# Patient Record
Sex: MALE | Race: WHITE | NOT HISPANIC OR LATINO | Employment: OTHER | URBAN - METROPOLITAN AREA
[De-identification: names, ages, dates, MRNs, and addresses within clinical notes are randomized per-mention and may not be internally consistent; named-entity substitution may affect disease eponyms.]

---

## 2017-03-17 ENCOUNTER — OFFICE VISIT (OUTPATIENT)
Dept: CARDIOLOGY | Facility: CLINIC | Age: 62
End: 2017-03-17

## 2017-03-17 VITALS
WEIGHT: 187 LBS | HEART RATE: 68 BPM | SYSTOLIC BLOOD PRESSURE: 173 MMHG | BODY MASS INDEX: 27.7 KG/M2 | DIASTOLIC BLOOD PRESSURE: 94 MMHG | HEIGHT: 69 IN

## 2017-03-17 DIAGNOSIS — E78.2 MIXED HYPERLIPIDEMIA: ICD-10-CM

## 2017-03-17 DIAGNOSIS — I50.22 CHRONIC SYSTOLIC CONGESTIVE HEART FAILURE (HCC): Primary | ICD-10-CM

## 2017-03-17 DIAGNOSIS — I10 ESSENTIAL HYPERTENSION: ICD-10-CM

## 2017-03-17 PROCEDURE — 99213 OFFICE O/P EST LOW 20 MIN: CPT | Performed by: INTERNAL MEDICINE

## 2017-03-17 RX ORDER — CARVEDILOL 12.5 MG/1
12.5 TABLET ORAL 2 TIMES DAILY WITH MEALS
COMMUNITY
Start: 2017-03-13 | End: 2017-06-14 | Stop reason: SDUPTHER

## 2017-03-17 RX ORDER — AMLODIPINE BESYLATE 10 MG/1
TABLET ORAL
COMMUNITY
Start: 2017-03-13 | End: 2017-06-14 | Stop reason: SDUPTHER

## 2017-03-17 RX ORDER — CHLORTHALIDONE 25 MG/1
25 TABLET ORAL DAILY
Qty: 90 TABLET | Refills: 3 | Status: SHIPPED | OUTPATIENT
Start: 2017-03-17 | End: 2017-10-19

## 2017-03-17 NOTE — PROGRESS NOTES
[facility] at [facility]  Office Progress Note  Rony He  1955  [phone]      Visit Date: 03/17/2017    PCP: Arely Vidal MD  [address] [facility] [address]    IDENTIFICATION: A 61 y.o. male from [place], [place].    Chief Complaint   Patient presents with   • Follow-up     HTN/CAD       PROBLEM LIST:   1.  coronary artery disease:  a. In 2007, EKG stress: 13 METs with no arrhythmias.  b. In 2004, Cardiolite stress with inferior scar, EF 53%.  c. 2002 C [place] reportedly w anterior MI - data def  2. Dyslipidemia.  a. April 2015: Total cholesterol 2003, triglycerides 190, HDL 31, and .   3. Hypertension -accelerated  4. CHF       2/17 Echo sev LVH EF 45%- [facility]       2/17 flash pulm edema [facility] SBP reportedly 300  5. Remote appendectomy and inguinal herniorrhaphy x2.  6. Nicotine add- reformed    Allergies  Allergies   Allergen Reactions   • Penicillins        Current Medications    Current Outpatient Prescriptions:   •  aspirin 325 MG tablet, Take 1 tablet by mouth Daily., Disp: 90 tablet, Rfl: 3  •  atorvastatin (LIPITOR) 80 MG tablet, Take 1 tablet by mouth Daily., Disp: 90 tablet, Rfl: 3  •  carvedilol (COREG) 12.5 MG tablet, 12.5 mg 2 (Two) Times a Day With Meals., Disp: , Rfl:   •  isosorbide mononitrate (IMDUR) 60 MG 24 hr tablet, Take 1 tablet by mouth Daily., Disp: 90 tablet, Rfl: 3  •  losartan (COZAAR) 100 MG tablet, Take 1 tablet by mouth Daily., Disp: 90 tablet, Rfl: 3  •  nitroglycerin (NITROSTAT) 0.4 MG SL tablet, Place 1 tablet under the tongue Every 5 (Five) Minutes As Needed for chest pain. Take no more than 3 doses in 15 minutes., Disp: 30 tablet, Rfl: 3  •  amLODIPine (NORVASC) 10 MG tablet, , Disp: , Rfl:       History of Present Illness     Pt presents in follow-up he was taken U [facility] 2 of 17 with flash pulmonary edema  Hypertension with systolic of 300.  He underwent a secondary evaluation for hypertension that was benign was diuresed with  "adjustment of blood pressure medications and states he has improved.  He is still unclear as to the exact culprit of his demise.  He states he had a flulike illness week before and was febrile and was taking over-the-counter preparations.  He states at home his blood pressure typically with systolics of 150s since discharge from hospital.    ROS:  All systems have been reviewed and are negative with the exception of those mentioned in the HPI.    OBJECTIVE:  Vitals:    03/17/17 0955 03/17/17 1000   BP: (!) 191/96 173/94   BP Location: Left arm Left arm   Patient Position: Sitting Standing   Pulse: 67 68   Weight: 187 lb (84.8 kg)    Height: 69\" (175.3 cm)      Physical Exam   Constitutional: He appears well-developed and well-nourished.   Neck: Normal range of motion. Neck supple. No hepatojugular reflux and no JVD present. Carotid bruit is not present. No tracheal deviation present. No thyromegaly present.   Cardiovascular: Normal rate, regular rhythm, S1 normal, S2 normal, intact distal pulses and normal pulses.  PMI is not displaced.  Exam reveals gallop and S4. Exam reveals no distant heart sounds, no friction rub, no midsystolic click and no opening snap.    No murmur heard.  Pulses:       Radial pulses are 2+ on the right side, and 2+ on the left side.        Dorsalis pedis pulses are 2+ on the right side, and 2+ on the left side.        Posterior tibial pulses are 2+ on the right side, and 2+ on the left side.   Pulmonary/Chest: Effort normal and breath sounds normal. He has no wheezes. He has no rales.   Abdominal: Soft. Bowel sounds are normal. He exhibits no mass. There is no tenderness. There is no guarding.       Diagnostic Data:  Procedures      ASSESSMENT:   Diagnosis Plan   1. Chronic systolic congestive heart failure     2. Essential hypertension     3. Mixed hyperlipidemia         PLAN:  Hypertensive cardiomyopathy with uncontrolled blood pressure we will follow-up his blood pressure medications at " his local pharmacy.  I would add chlorthalidone to his current with a low threshold for clonidine if uncontrolled with addition of after mentioned.    Remote coronary disease no anginal equivalent with no enzyme bump despite blood pressure of 300 will continue observation    Dyslipidemia on statin therapy    Arely Vidal MD, thank you for referring Mr. He for evaluation.  I have forwarded my electronically generated recommendations to you for review.  Please do not hesitate to call with any questions.      Job Wood MD, FACC

## 2017-03-23 ENCOUNTER — TELEPHONE (OUTPATIENT)
Dept: CARDIOLOGY | Facility: CLINIC | Age: 62
End: 2017-03-23

## 2017-06-14 RX ORDER — AMLODIPINE BESYLATE 10 MG/1
10 TABLET ORAL DAILY
Qty: 90 TABLET | Refills: 1 | Status: SHIPPED | OUTPATIENT
Start: 2017-06-14 | End: 2017-10-19 | Stop reason: SDUPTHER

## 2017-06-14 RX ORDER — CARVEDILOL 12.5 MG/1
12.5 TABLET ORAL 2 TIMES DAILY WITH MEALS
Qty: 180 TABLET | Refills: 1 | Status: SHIPPED | OUTPATIENT
Start: 2017-06-14 | End: 2017-10-19 | Stop reason: SDUPTHER

## 2017-10-19 ENCOUNTER — OFFICE VISIT (OUTPATIENT)
Dept: CARDIOLOGY | Facility: CLINIC | Age: 62
End: 2017-10-19

## 2017-10-19 VITALS
BODY MASS INDEX: 29.78 KG/M2 | HEART RATE: 74 BPM | DIASTOLIC BLOOD PRESSURE: 103 MMHG | SYSTOLIC BLOOD PRESSURE: 184 MMHG | WEIGHT: 208 LBS | HEIGHT: 70 IN

## 2017-10-19 DIAGNOSIS — I10 ESSENTIAL HYPERTENSION: Primary | ICD-10-CM

## 2017-10-19 DIAGNOSIS — E78.5 DYSLIPIDEMIA: ICD-10-CM

## 2017-10-19 DIAGNOSIS — I50.22 CHRONIC SYSTOLIC CONGESTIVE HEART FAILURE (HCC): ICD-10-CM

## 2017-10-19 PROCEDURE — 99214 OFFICE O/P EST MOD 30 MIN: CPT | Performed by: INTERNAL MEDICINE

## 2017-10-19 RX ORDER — CARVEDILOL 12.5 MG/1
12.5 TABLET ORAL 2 TIMES DAILY WITH MEALS
Qty: 60 TABLET | Refills: 11 | Status: SHIPPED | OUTPATIENT
Start: 2017-10-19 | End: 2018-09-24 | Stop reason: SDUPTHER

## 2017-10-19 RX ORDER — CLONIDINE HYDROCHLORIDE 0.1 MG/1
0.1 TABLET ORAL 3 TIMES DAILY
Qty: 270 TABLET | Refills: 3 | Status: SHIPPED | OUTPATIENT
Start: 2017-10-19 | End: 2018-09-24 | Stop reason: SDUPTHER

## 2017-10-19 RX ORDER — ATORVASTATIN CALCIUM 20 MG/1
20 TABLET, FILM COATED ORAL DAILY
Qty: 30 TABLET | Refills: 11 | Status: SHIPPED | OUTPATIENT
Start: 2017-10-19 | End: 2018-09-24 | Stop reason: SDUPTHER

## 2017-10-19 RX ORDER — AMLODIPINE BESYLATE 10 MG/1
10 TABLET ORAL DAILY
Qty: 30 TABLET | Refills: 11 | Status: SHIPPED | OUTPATIENT
Start: 2017-10-19 | End: 2018-09-24 | Stop reason: SDUPTHER

## 2017-10-19 RX ORDER — ISOSORBIDE MONONITRATE 60 MG/1
60 TABLET, EXTENDED RELEASE ORAL DAILY
Qty: 30 TABLET | Refills: 11 | Status: SHIPPED | OUTPATIENT
Start: 2017-10-19 | End: 2018-09-24 | Stop reason: SDUPTHER

## 2017-10-19 NOTE — PROGRESS NOTES
[facility] at [facility]  Office Progress Note  Rony He  1955  [phone]      Visit Date: 10/19/2017    PCP: BRET GUTIERREZ  [address]  [facility] [address]    IDENTIFICATION: A 62 y.o. male from [place], [place]    Chief Complaint   Patient presents with   • Follow-up   • Coronary Artery Disease   • Hypertension       PROBLEM LIST:   1. Coronary artery disease:  a. In 2007, EKG stress: 13 METs with no arrhythmias.  b. In 2004, Cardiolite stress with inferior scar, EF 53%.  c. 2002 C [place] reportedly w anterior MI - data def  d. MPS 9/2017: Inferior scar and no evidence for ischemia, LVEF 35%  2. Dyslipidemia.  a. April 2015: Total cholesterol 2003, triglycerides 190, HDL 31, and .   3. Hypertension -accelerated  4. CHF  a. 2/17 Echo sev LVH EF 45%- [facility]  b. 2/17 flash pulm edema [facility] SBP reportedly 300  c. 9/17 echo [place] LVEF 45%  5. Remote appendectomy and inguinal herniorrhaphy x2.  6. Nicotine add- reformed    7. CKD 3- creat 1.9 2017.  Had dc of acei/diuretic then.  Allergies  Allergies   Allergen Reactions   • Penicillins        Current Medications    Current Outpatient Prescriptions:   •  amLODIPine (NORVASC) 10 MG tablet, Take 1 tablet by mouth Daily., Disp: 30 tablet, Rfl: 11  •  aspirin 325 MG tablet, Take 1 tablet by mouth Daily., Disp: 90 tablet, Rfl: 3  •  atorvastatin (LIPITOR) 20 MG tablet, Take 1 tablet by mouth Daily., Disp: 30 tablet, Rfl: 11  •  carvedilol (COREG) 12.5 MG tablet, Take 1 tablet by mouth 2 (Two) Times a Day With Meals., Disp: 60 tablet, Rfl: 11  •  isosorbide mononitrate (IMDUR) 60 MG 24 hr tablet, Take 1 tablet by mouth Daily., Disp: 30 tablet, Rfl: 11  •  nitroglycerin (NITROSTAT) 0.4 MG SL tablet, Place 1 tablet under the tongue Every 5 (Five) Minutes As Needed for chest pain. Take no more than 3 doses in 15 minutes., Disp: 30 tablet, Rfl: 3      History of Present Illness   Patient here for follow-up.  He was recently seen at  "[facility] on 9/24/17 for chest pain and at that time he had 3 negative troponins and no acute EKG changes.  He underwent a nuclear stress test that showed prior inferior infarct with no evidence of myocardial ischemia acutely, and a LVEF of 35%.  An echocardiogram then showed LVEF 40-45%, mildly dilated LV, akinesis of the inferior wall and lateral wall.  Stage I diastolic dysfunction.  Elevated LV filling pressures, and moderate eccentric LVH, moderately dilated LA.  His initial EKG did show prolonged QTC, however this decreased the next day.  Additionally it was noted on telemetry that he had a 12 beat run of nonsustained ventricular tachycardia at which time he was asymptomatic. The cardiologist in [place] recommended he follow up with Dr. Wood sooner than scheduled. Since then, he denies any chest pain, dyspnea, dyspnea on exertion, orthopnea, PND, palpitations, lower extremity edema, or claudication.  His BP is up today, and his chlorthalidone and losartan were d/c'd at the [facility] due to a serum Cr of 1.9. Pt reports that his BP has been high at home as well. He has taken 50mg of his wife's hydralazine and it helped.     ROS:  All systems have been reviewed and are negative with the exception of those mentioned in the HPI.    OBJECTIVE:  Vitals:    10/19/17 0907   BP: (!) 184/103   BP Location: Left arm   Patient Position: Sitting   Pulse: 74   Weight: 208 lb (94.3 kg)   Height: 70\" (177.8 cm)     Physical Exam   Constitutional: He is oriented to person, place, and time. He appears well-developed and well-nourished. No distress.   obese   Neck: Normal range of motion. Neck supple. No hepatojugular reflux and no JVD present. Carotid bruit is not present. No tracheal deviation present. No thyromegaly present.   Cardiovascular: Normal rate, regular rhythm, S1 normal, S2 normal, intact distal pulses and normal pulses.  PMI is not displaced.  Exam reveals no distant heart " sounds, no friction rub, no midsystolic click and no opening snap.    No murmur heard.  Pulses:       Radial pulses are 2+ on the right side, and 2+ on the left side.        Dorsalis pedis pulses are 2+ on the right side, and 2+ on the left side.        Posterior tibial pulses are 2+ on the right side, and 2+ on the left side.   Pulmonary/Chest: Effort normal and breath sounds normal. He has no wheezes. He has no rales.   Abdominal: Soft. Bowel sounds are normal. He exhibits no mass. There is no tenderness. There is no guarding.   Musculoskeletal: He exhibits no edema or tenderness.   Neurological: He is alert and oriented to person, place, and time.   Skin: Skin is warm and dry. No rash noted.   Psychiatric: He has a normal mood and affect.   Nursing note and vitals reviewed.      Diagnostic Data:  Procedures      ASSESSMENT:   Diagnosis Plan   1. Essential hypertension     2. Dyslipidemia     3. Chronic systolic congestive heart failure         PLAN:  1. Poor control of hypertension with cessation of arband thiazide.  We will add clonidine 0.1 3 times daily.  Patient is to call our office in 2 weeks with his BP readings we will titrate as needed.  2. Continue statin therapy  3. We will follow-up with echocardiograms    BRET GUTIERREZ, thank you for referring Mr. He for evaluation.  I have forwarded my electronically generated recommendations to you for review.  Please do not hesitate to call with any questions.    Scribed for Job Wood MD by Shirlene Christian PA-C. 10/19/2017  10:02 AM  I, Job Wood MD, personally performed the services described in this documentation as scribed by the above named individual in my presence, and it is both accurate and complete.  10/19/2017  11:26 AM    Job Wood MD, [facility]

## 2017-11-03 ENCOUNTER — TELEPHONE (OUTPATIENT)
Dept: CARDIOLOGY | Facility: CLINIC | Age: 62
End: 2017-11-03

## 2017-11-03 NOTE — TELEPHONE ENCOUNTER
Patient calls to report BP readings for the last couple weeks as Dr. Wood instructed of him.     10/20: 149/77  10/21: 137/76  10/22: 138/64  10/23: 137/66  10/24: 136/64  10/25: 144/72  10/26: 140/75  10/27: 141/70  10/28: 142/69  10/29: 139/68   10/30: 139/70  10/31: 141/72  11/1: 143/71  11/2: 141/72    Patient has been taking his newly prescribed clonidine 0.1 mg TID as instructed by Dr. Wood. Informed him these were much improved BP from the readings in our office. Encouraged him to continue his current medical therapy and to call back with any further issues. He verbalized understanding.

## 2018-09-24 DIAGNOSIS — I10 ESSENTIAL HYPERTENSION: ICD-10-CM

## 2018-09-24 RX ORDER — ISOSORBIDE MONONITRATE 60 MG/1
60 TABLET, EXTENDED RELEASE ORAL DAILY
Qty: 30 TABLET | Refills: 2 | Status: SHIPPED | OUTPATIENT
Start: 2018-09-24 | End: 2019-01-05 | Stop reason: SDUPTHER

## 2018-09-24 RX ORDER — CLONIDINE HYDROCHLORIDE 0.1 MG/1
0.1 TABLET ORAL 3 TIMES DAILY
Qty: 270 TABLET | Refills: 3 | Status: SHIPPED | OUTPATIENT
Start: 2018-09-24 | End: 2019-01-07 | Stop reason: SDUPTHER

## 2018-09-24 RX ORDER — CARVEDILOL 12.5 MG/1
12.5 TABLET ORAL 2 TIMES DAILY WITH MEALS
Qty: 60 TABLET | Refills: 2 | Status: SHIPPED | OUTPATIENT
Start: 2018-09-24 | End: 2019-01-07 | Stop reason: SDUPTHER

## 2018-09-24 RX ORDER — AMLODIPINE BESYLATE 10 MG/1
10 TABLET ORAL DAILY
Qty: 30 TABLET | Refills: 2 | Status: SHIPPED | OUTPATIENT
Start: 2018-09-24 | End: 2018-12-18 | Stop reason: SINTOL

## 2018-09-24 RX ORDER — ATORVASTATIN CALCIUM 20 MG/1
20 TABLET, FILM COATED ORAL DAILY
Qty: 30 TABLET | Refills: 2 | Status: SHIPPED | OUTPATIENT
Start: 2018-09-24 | End: 2019-01-05 | Stop reason: SDUPTHER

## 2018-12-13 ENCOUNTER — LAB (OUTPATIENT)
Dept: LAB | Facility: HOSPITAL | Age: 63
End: 2018-12-13

## 2018-12-13 ENCOUNTER — OFFICE VISIT (OUTPATIENT)
Dept: CARDIOLOGY | Facility: CLINIC | Age: 63
End: 2018-12-13

## 2018-12-13 ENCOUNTER — TRANSCRIBE ORDERS (OUTPATIENT)
Dept: LAB | Facility: HOSPITAL | Age: 63
End: 2018-12-13

## 2018-12-13 VITALS
BODY MASS INDEX: 28.45 KG/M2 | DIASTOLIC BLOOD PRESSURE: 90 MMHG | WEIGHT: 198.7 LBS | OXYGEN SATURATION: 96 % | HEIGHT: 70 IN | SYSTOLIC BLOOD PRESSURE: 152 MMHG | HEART RATE: 71 BPM

## 2018-12-13 DIAGNOSIS — Q75.2 HYPERTELORISM: Primary | ICD-10-CM

## 2018-12-13 DIAGNOSIS — N18.30 STAGE 3 CHRONIC KIDNEY DISEASE (HCC): ICD-10-CM

## 2018-12-13 DIAGNOSIS — I51.7 LVH (LEFT VENTRICULAR HYPERTROPHY): ICD-10-CM

## 2018-12-13 DIAGNOSIS — E78.2 MIXED HYPERLIPIDEMIA: ICD-10-CM

## 2018-12-13 DIAGNOSIS — I10 ESSENTIAL HYPERTENSION: Primary | ICD-10-CM

## 2018-12-13 LAB
ANION GAP SERPL CALCULATED.3IONS-SCNC: 8 MMOL/L (ref 3–11)
BNP SERPL-MCNC: 347 PG/ML (ref 0–100)
BUN BLD-MCNC: 38 MG/DL (ref 9–23)
BUN/CREAT SERPL: 23.5 (ref 7–25)
CALCIUM SPEC-SCNC: 8.6 MG/DL (ref 8.7–10.4)
CHLORIDE SERPL-SCNC: 104 MMOL/L (ref 99–109)
CO2 SERPL-SCNC: 27 MMOL/L (ref 20–31)
CREAT BLD-MCNC: 1.62 MG/DL (ref 0.6–1.3)
DEPRECATED RDW RBC AUTO: 43.8 FL (ref 37–54)
ERYTHROCYTE [DISTWIDTH] IN BLOOD BY AUTOMATED COUNT: 14 % (ref 11.3–14.5)
GFR SERPL CREATININE-BSD FRML MDRD: 43 ML/MIN/1.73
GLUCOSE BLD-MCNC: 83 MG/DL (ref 70–100)
HCT VFR BLD AUTO: 42.6 % (ref 38.9–50.9)
HGB BLD-MCNC: 13.9 G/DL (ref 13.1–17.5)
MCH RBC QN AUTO: 27.9 PG (ref 27–31)
MCHC RBC AUTO-ENTMCNC: 32.6 G/DL (ref 32–36)
MCV RBC AUTO: 85.5 FL (ref 80–99)
PLATELET # BLD AUTO: 269 10*3/MM3 (ref 150–450)
PMV BLD AUTO: 12.7 FL (ref 6–12)
POTASSIUM BLD-SCNC: 4.1 MMOL/L (ref 3.5–5.5)
RBC # BLD AUTO: 4.98 10*6/MM3 (ref 4.2–5.76)
SODIUM BLD-SCNC: 139 MMOL/L (ref 132–146)
WBC NRBC COR # BLD: 10.17 10*3/MM3 (ref 3.5–10.8)

## 2018-12-13 PROCEDURE — 85027 COMPLETE CBC AUTOMATED: CPT | Performed by: INTERNAL MEDICINE

## 2018-12-13 PROCEDURE — 83880 ASSAY OF NATRIURETIC PEPTIDE: CPT

## 2018-12-13 PROCEDURE — 36415 COLL VENOUS BLD VENIPUNCTURE: CPT | Performed by: INTERNAL MEDICINE

## 2018-12-13 PROCEDURE — 80048 BASIC METABOLIC PNL TOTAL CA: CPT | Performed by: INTERNAL MEDICINE

## 2018-12-13 PROCEDURE — 99213 OFFICE O/P EST LOW 20 MIN: CPT | Performed by: INTERNAL MEDICINE

## 2018-12-13 NOTE — PROGRESS NOTES
[facility] at [facility]  Office Progress Note  Rony He  1955  [phone]      Visit Date: 12/13/2018     PCP: Provider, No Known  [facility]  [facility] [address]    IDENTIFICATION: A 63 y.o. male  2019,  from [place], [place]    Chief Complaint   Patient presents with   • Hypertension   • Coronary Artery Disease       PROBLEM LIST:   1. Coronary artery disease:  a. In 2007, EKG stress: 13 METs with no arrhythmias.  b. In 2004, Cardiolite stress with inferior scar, EF 53%.  c. 2002 C [place] reportedly w anterior MI - data def  d. MPS 9/2017: Inferior scar and no evidence for ischemia, LVEF 35%  2. Dyslipidemia.  a. April 2015: Total cholesterol 2003, triglycerides 190, HDL 31, and .   3. Hypertension -accelerated  4. CHF  a. 2/17 Echo sev LVH EF 45%- [facility]  b. 2/17 flash pulm edema [facility] SBP reportedly 300  c. 9/17 echo [place] LVEF 45%  5. Remote appendectomy and inguinal herniorrhaphy x2.  6. Nicotine add- reformed  7. CKD 3- creat 1.9 2017.  Had dc of acei/diuretic then.    Allergies  Allergies   Allergen Reactions   • Penicillins        Current Medications    Current Outpatient Medications:   •  amLODIPine (NORVASC) 10 MG tablet, Take 1 tablet by mouth Daily., Disp: 30 tablet, Rfl: 2  •  aspirin 325 MG tablet, Take 1 tablet by mouth Daily., Disp: 90 tablet, Rfl: 3  •  atorvastatin (LIPITOR) 20 MG tablet, Take 1 tablet by mouth Daily., Disp: 30 tablet, Rfl: 2  •  carvedilol (COREG) 12.5 MG tablet, Take 1 tablet by mouth 2 (Two) Times a Day With Meals., Disp: 60 tablet, Rfl: 2  •  CloNIDine (CATAPRES) 0.1 MG tablet, Take 1 tablet by mouth 3 (Three) Times a Day., Disp: 270 tablet, Rfl: 3  •  isosorbide mononitrate (IMDUR) 60 MG 24 hr tablet, Take 1 tablet by mouth Daily., Disp: 30 tablet, Rfl: 2  •  nitroglycerin (NITROSTAT) 0.4 MG SL tablet, Place 1 tablet under the tongue Every 5 (Five) Minutes As Needed for chest pain. Take no more than 3 doses in 15  "minutes., Disp: 30 tablet, Rfl: 3      History of Present Illness   Patient here for follow-up. His wife passed away earlier this year and he has been lost to fu.  He feels that transition of his bp meds 2 years ago by inpt mds at [place] have resulted in significant lower extremity swelling and nocturia.  He states he has chronic fatigue.  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  He notes no overt chest discomfort his baseline shortness of breath is not significant change in his had no lab work this year  ROS:  All systems have been reviewed and are negative with the exception of those mentioned in the HPI.    OBJECTIVE:  Vitals:    12/13/18 1050   BP: 152/90   BP Location: Left arm   Patient Position: Sitting   Pulse: 71   SpO2: 96%   Weight: 90.1 kg (198 lb 11.2 oz)   Height: 177.8 cm (70\")     Physical Exam   Constitutional: He is oriented to person, place, and time. He appears well-developed and well-nourished. No distress.   obese   Neck: Normal range of motion. Neck supple. No hepatojugular reflux and no JVD present. Carotid bruit is not present. No tracheal deviation present. No thyromegaly present.   Cardiovascular: Normal rate, regular rhythm, S1 normal, S2 normal, intact distal pulses and normal pulses. PMI is not displaced. Exam reveals no distant heart sounds, no friction rub, no midsystolic click and no opening snap.   No murmur heard.  Pulses:       Radial pulses are 2+ on the right side, and 2+ on the left side.        Dorsalis pedis pulses are 2+ on the right side, and 2+ on the left side.        Posterior tibial pulses are 2+ on the right side, and 2+ on the left side.   Pulmonary/Chest: Effort normal and breath sounds normal. He has no wheezes. He has no rales.   Abdominal: Soft. Bowel sounds are normal. He exhibits no mass. There is no tenderness. There is no guarding.   Musculoskeletal: He exhibits no edema or tenderness.   Neurological: He is " alert and oriented to person, place, and time.   Skin: Skin is warm and dry. No rash noted.   Psychiatric: He has a normal mood and affect.   Nursing note and vitals reviewed.      Diagnostic Data:  Procedures      ASSESSMENT:   Diagnosis Plan   1. Essential hypertension     2. Mixed hyperlipidemia     3. LVH (left ventricular hypertrophy)         PLAN:  1. Poor control of hypertension .  He will continue to utilize current medication until lab work obtained today can be   2. Continue statin therapy  3. We will follow-up with echocardiograms    Provider, No Known, thank you for referring Mr. He for evaluation.  I have forwarded my electronically generated recommendations to you for review.  Please do not hesitate to call with any questions.    Scribed for Job Wood MD by Shirlene Christian PA-C. 12/13/2018  12:57 PM   I, Job Wood MD, personally performed the services described in this documentation as scribed by the above named individual in my presence, and it is both accurate and complete.  12/13/2018  12:58 PM    Job Wood MD, [facility]

## 2018-12-18 ENCOUNTER — TELEPHONE (OUTPATIENT)
Dept: CARDIOLOGY | Facility: CLINIC | Age: 63
End: 2018-12-18

## 2018-12-18 RX ORDER — NIFEDIPINE 60 MG/1
60 TABLET, EXTENDED RELEASE ORAL DAILY
Qty: 90 TABLET | Refills: 3 | Status: SHIPPED | OUTPATIENT
Start: 2018-12-18 | End: 2019-02-11

## 2018-12-18 NOTE — TELEPHONE ENCOUNTER
Called patient and let him know that Dr Wood reviewed his labs and wanted him to know that his kidney function mildly worsened ,Follow bps , Continue current Rx.     Patient state dhe does not want to take amlodipine as it dehydrates him and he has been taking HCTZ. Informed him we do not have HCTZ listed but with his worsened kidney function would not recommend he take without MD approval. Patient verbalized understanding but said he will not take amlodipine and wants a alternative medication.

## 2018-12-18 NOTE — TELEPHONE ENCOUNTER
Called patient and let him know to stop the amlodipine and Dr Wood sent in procardia xl 60mg daily. Patient verbalized understanding.

## 2019-01-05 DIAGNOSIS — I10 ESSENTIAL HYPERTENSION: ICD-10-CM

## 2019-01-07 RX ORDER — ISOSORBIDE MONONITRATE 60 MG/1
TABLET, EXTENDED RELEASE ORAL
Refills: 3 | OUTPATIENT
Start: 2019-01-07

## 2019-01-07 RX ORDER — CLONIDINE HYDROCHLORIDE 0.1 MG/1
0.1 TABLET ORAL 3 TIMES DAILY
Qty: 270 TABLET | Refills: 3 | Status: SHIPPED | OUTPATIENT
Start: 2019-01-07 | End: 2019-02-14 | Stop reason: HOSPADM

## 2019-01-07 RX ORDER — ISOSORBIDE MONONITRATE 60 MG/1
TABLET, EXTENDED RELEASE ORAL
Qty: 30 TABLET | Refills: 3 | Status: SHIPPED | OUTPATIENT
Start: 2019-01-07 | End: 2019-05-25 | Stop reason: SDUPTHER

## 2019-01-07 RX ORDER — AMLODIPINE BESYLATE 10 MG/1
TABLET ORAL
Qty: 30 TABLET | Refills: 2 | OUTPATIENT
Start: 2019-01-07

## 2019-01-07 RX ORDER — ATORVASTATIN CALCIUM 20 MG/1
TABLET, FILM COATED ORAL
Qty: 30 TABLET | Refills: 3 | Status: SHIPPED | OUTPATIENT
Start: 2019-01-07 | End: 2019-02-26 | Stop reason: HOSPADM

## 2019-01-07 RX ORDER — CARVEDILOL 12.5 MG/1
12.5 TABLET ORAL 2 TIMES DAILY WITH MEALS
Qty: 180 TABLET | Refills: 3 | Status: SHIPPED | OUTPATIENT
Start: 2019-01-07 | End: 2019-02-11

## 2019-02-11 ENCOUNTER — APPOINTMENT (OUTPATIENT)
Dept: CARDIOLOGY | Facility: HOSPITAL | Age: 64
End: 2019-02-11

## 2019-02-11 ENCOUNTER — HOSPITAL ENCOUNTER (INPATIENT)
Facility: HOSPITAL | Age: 64
LOS: 3 days | Discharge: HOME | End: 2019-02-14
Attending: EMERGENCY MEDICINE | Admitting: INTERNAL MEDICINE

## 2019-02-11 ENCOUNTER — APPOINTMENT (OUTPATIENT)
Dept: GENERAL RADIOLOGY | Facility: HOSPITAL | Age: 64
End: 2019-02-11

## 2019-02-11 DIAGNOSIS — I16.1 HYPERTENSIVE EMERGENCY: ICD-10-CM

## 2019-02-11 DIAGNOSIS — I10 MALIGNANT HYPERTENSION: Primary | ICD-10-CM

## 2019-02-11 DIAGNOSIS — R07.9 CHEST PAIN, UNSPECIFIED TYPE: ICD-10-CM

## 2019-02-11 DIAGNOSIS — I10 ESSENTIAL HYPERTENSION: ICD-10-CM

## 2019-02-11 DIAGNOSIS — R77.8 ELEVATED TROPONIN: ICD-10-CM

## 2019-02-11 DIAGNOSIS — I50.22 CHRONIC SYSTOLIC CONGESTIVE HEART FAILURE (HCC): ICD-10-CM

## 2019-02-11 PROBLEM — Z72.0 TOBACCO USE: Status: ACTIVE | Noted: 2019-02-11

## 2019-02-11 LAB
ALBUMIN SERPL-MCNC: 3.9 G/DL (ref 3.2–4.8)
ALBUMIN/GLOB SERPL: 1.6 G/DL (ref 1.5–2.5)
ALP SERPL-CCNC: 81 U/L (ref 25–100)
ALT SERPL W P-5'-P-CCNC: 14 U/L (ref 7–40)
ANION GAP SERPL CALCULATED.3IONS-SCNC: 5 MMOL/L (ref 3–11)
AST SERPL-CCNC: 18 U/L (ref 0–33)
BASOPHILS # BLD AUTO: 0.03 10*3/MM3 (ref 0–0.2)
BASOPHILS NFR BLD AUTO: 0.3 % (ref 0–1)
BH CV ECHO MEAS - AI DEC SLOPE: 170.8 CM/SEC^2
BH CV ECHO MEAS - AI MAX PG: 75.1 MMHG
BH CV ECHO MEAS - AI MAX VEL: 433.3 CM/SEC
BH CV ECHO MEAS - AI P1/2T: 743.2 MSEC
BH CV ECHO MEAS - AO MAX PG (FULL): 22 MMHG
BH CV ECHO MEAS - AO MAX PG: 29.8 MMHG
BH CV ECHO MEAS - AO MEAN PG (FULL): 10.8 MMHG
BH CV ECHO MEAS - AO MEAN PG: 14.4 MMHG
BH CV ECHO MEAS - AO ROOT AREA (BSA CORRECTED): 1.6
BH CV ECHO MEAS - AO ROOT AREA: 8.6 CM^2
BH CV ECHO MEAS - AO ROOT DIAM: 3.3 CM
BH CV ECHO MEAS - AO V2 MAX: 272.8 CM/SEC
BH CV ECHO MEAS - AO V2 MEAN: 177.6 CM/SEC
BH CV ECHO MEAS - AO V2 VTI: 46.7 CM
BH CV ECHO MEAS - AVA(I,A): 1.5 CM^2
BH CV ECHO MEAS - AVA(I,D): 1.5 CM^2
BH CV ECHO MEAS - AVA(V,A): 1.6 CM^2
BH CV ECHO MEAS - AVA(V,D): 1.6 CM^2
BH CV ECHO MEAS - BSA(HAYCOCK): 2.2 M^2
BH CV ECHO MEAS - BSA(HAYCOCK): 2.2 M^2
BH CV ECHO MEAS - BSA: 2.1 M^2
BH CV ECHO MEAS - BSA: 2.1 M^2
BH CV ECHO MEAS - BZI_BMI: 29.4 KILOGRAMS/M^2
BH CV ECHO MEAS - BZI_BMI: 29.4 KILOGRAMS/M^2
BH CV ECHO MEAS - BZI_METRIC_HEIGHT: 177.8 CM
BH CV ECHO MEAS - BZI_METRIC_HEIGHT: 177.8 CM
BH CV ECHO MEAS - BZI_METRIC_WEIGHT: 93 KG
BH CV ECHO MEAS - BZI_METRIC_WEIGHT: 93 KG
BH CV ECHO MEAS - EDV(CUBED): 254.6 ML
BH CV ECHO MEAS - EDV(MOD-SP2): 113 ML
BH CV ECHO MEAS - EDV(MOD-SP4): 116 ML
BH CV ECHO MEAS - EDV(TEICH): 204 ML
BH CV ECHO MEAS - EF(CUBED): 71.5 %
BH CV ECHO MEAS - EF(MOD-BP): 54 %
BH CV ECHO MEAS - EF(MOD-SP2): 46 %
BH CV ECHO MEAS - EF(MOD-SP4): 60.3 %
BH CV ECHO MEAS - EF(TEICH): 62 %
BH CV ECHO MEAS - ESV(CUBED): 72.7 ML
BH CV ECHO MEAS - ESV(MOD-SP2): 61 ML
BH CV ECHO MEAS - ESV(MOD-SP4): 46 ML
BH CV ECHO MEAS - ESV(TEICH): 77.4 ML
BH CV ECHO MEAS - FS: 34.2 %
BH CV ECHO MEAS - IVS/LVPW: 1
BH CV ECHO MEAS - IVSD: 1.6 CM
BH CV ECHO MEAS - LA DIMENSION: 4.3 CM
BH CV ECHO MEAS - LA/AO: 1.3
BH CV ECHO MEAS - LAD MAJOR: 7 CM
BH CV ECHO MEAS - LAT PEAK E' VEL: 4.9 CM/SEC
BH CV ECHO MEAS - LATERAL E/E' RATIO: 14.3
BH CV ECHO MEAS - LV DIASTOLIC VOL/BSA (35-75): 55 ML/M^2
BH CV ECHO MEAS - LV MASS(C)D: 497.7 GRAMS
BH CV ECHO MEAS - LV MASS(C)DI: 235.9 GRAMS/M^2
BH CV ECHO MEAS - LV MAX PG: 7.7 MMHG
BH CV ECHO MEAS - LV MEAN PG: 3.5 MMHG
BH CV ECHO MEAS - LV SYSTOLIC VOL/BSA (12-30): 21.8 ML/M^2
BH CV ECHO MEAS - LV V1 MAX: 139.2 CM/SEC
BH CV ECHO MEAS - LV V1 MEAN: 84.9 CM/SEC
BH CV ECHO MEAS - LV V1 VTI: 22.7 CM
BH CV ECHO MEAS - LVIDD: 6.3 CM
BH CV ECHO MEAS - LVIDS: 4.2 CM
BH CV ECHO MEAS - LVLD AP2: 9 CM
BH CV ECHO MEAS - LVLD AP4: 8.5 CM
BH CV ECHO MEAS - LVLS AP2: 8.4 CM
BH CV ECHO MEAS - LVLS AP4: 8.1 CM
BH CV ECHO MEAS - LVOT AREA (M): 3.1 CM^2
BH CV ECHO MEAS - LVOT AREA: 3.2 CM^2
BH CV ECHO MEAS - LVOT DIAM: 2 CM
BH CV ECHO MEAS - LVPWD: 1.5 CM
BH CV ECHO MEAS - MED PEAK E' VEL: 3.4 CM/SEC
BH CV ECHO MEAS - MEDIAL E/E' RATIO: 20.5
BH CV ECHO MEAS - MV A MAX VEL: 94.8 CM/SEC
BH CV ECHO MEAS - MV DEC SLOPE: 191.6 CM/SEC^2
BH CV ECHO MEAS - MV DEC TIME: 0.24 SEC
BH CV ECHO MEAS - MV E MAX VEL: 72.6 CM/SEC
BH CV ECHO MEAS - MV E/A: 0.77
BH CV ECHO MEAS - PA ACC SLOPE: 909 CM/SEC^2
BH CV ECHO MEAS - PA ACC TIME: 0.14 SEC
BH CV ECHO MEAS - PA PR(ACCEL): 16.6 MMHG
BH CV ECHO MEAS - PULM DIAS VEL: 38.2 CM/SEC
BH CV ECHO MEAS - PULM S/D: 1.5
BH CV ECHO MEAS - PULM SYS VEL: 55.5 CM/SEC
BH CV ECHO MEAS - SI(AO): 191 ML/M^2
BH CV ECHO MEAS - SI(CUBED): 86.2 ML/M^2
BH CV ECHO MEAS - SI(LVOT): 34 ML/M^2
BH CV ECHO MEAS - SI(MOD-SP2): 24.7 ML/M^2
BH CV ECHO MEAS - SI(MOD-SP4): 33.2 ML/M^2
BH CV ECHO MEAS - SI(TEICH): 60 ML/M^2
BH CV ECHO MEAS - SV(AO): 402.9 ML
BH CV ECHO MEAS - SV(CUBED): 181.9 ML
BH CV ECHO MEAS - SV(LVOT): 71.8 ML
BH CV ECHO MEAS - SV(MOD-SP2): 52 ML
BH CV ECHO MEAS - SV(MOD-SP4): 70 ML
BH CV ECHO MEAS - SV(TEICH): 126.6 ML
BH CV ECHO MEAS - TAPSE (>1.6): 3 CM2
BH CV ECHO MEASUREMENTS AVERAGE E/E' RATIO: 17.49
BH CV VAS BP RIGHT ARM: NORMAL MMHG
BH CV VAS BP RIGHT ARM: NORMAL MMHG
BH CV VAS KIDNEY HEIGHT LEFT: 4.9 CM
BH CV VAS RENAL AORTIC MID PSV: 47 CM/S
BH CV XLRA - RV BASE: 3.2 CM
BH CV XLRA - RV LENGTH: 7.6 CM
BH CV XLRA - RV MID: 2.6 CM
BH CV XLRA - TDI S': 15.4 CM/SEC
BH CV XLRA MEAS - KID L LEFT: 9.1 CM
BH CV XLRA MEAS - RENAL A ORG RI LEFT: 0.73
BH CV XLRA MEAS - SUP SEG EDV LEFT: 21.9 CM/SEC
BH CV XLRA MEAS - SUP SEG PSV LEFT: 56.2 CM/SEC
BH CV XLRA MEAS - SUP SEG RI LEFT: 0.61
BH CV XLRA MEAS DIST REN A EDV RIGHT: 22 CM/SEC
BH CV XLRA MEAS DIST REN A PSV RIGHT: 110 CM/SEC
BH CV XLRA MEAS DIST REN A RI RIGHT: 0.79
BH CV XLRA MEAS INF ARC EDV RIGHT: 6.8 CM/SEC
BH CV XLRA MEAS INF ARC PSV RIGHT: 20.9 CM/SEC
BH CV XLRA MEAS INF ARC RI RIGHT: 0.68
BH CV XLRA MEAS INF SEG EDV RIGHT: 6.2 CM/SEC
BH CV XLRA MEAS INF SEG PSV RIGHT: 22.8 CM/SEC
BH CV XLRA MEAS INF SEG RI RIGHT: 0.73
BH CV XLRA MEAS KID H RIGHT: 6.2 CM
BH CV XLRA MEAS KID L RIGHT: 13.1 CM
BH CV XLRA MEAS KID W RIGHT: 7.1 CM
BH CV XLRA MEAS MID REN A EDV RIGHT: 40 CM/SEC
BH CV XLRA MEAS MID REN A PSV RIGHT: 199 CM/SEC
BH CV XLRA MEAS MID REN A RI RIGHT: 0.8
BH CV XLRA MEAS PROX REN A EDV RIGHT: 39 CM/SEC
BH CV XLRA MEAS PROX REN A PSV RIGHT: 212 CM/SEC
BH CV XLRA MEAS PROX REN A RI RIGHT: 0.81
BH CV XLRA MEAS RAR RIGHT: 4.5
BH CV XLRA MEAS RENAL A ORG EDV RIGHT: 45 CM/SEC
BH CV XLRA MEAS RENAL A ORG PSV RIGHT: 197 CM/SEC
BH CV XLRA MEAS RENAL A ORG RI RIGHT: 0.77
BH CV XLRA MEAS SUP ARC EDV RIGHT: 6.5 CM/SEC
BH CV XLRA MEAS SUP ARC PSV RIGHT: 15.8 CM/SEC
BH CV XLRA MEAS SUP ARC RI RIGHT: 0.59
BH CV XLRA MEAS SUP SEG EDV RIGHT: 7.3 CM/SEC
BH CV XLRA MEAS SUP SEG PSV RIGHT: 25.3 CM/SEC
BH CV XLRA MEAS SUP SEG RI RIGHT: 0.71
BILIRUB SERPL-MCNC: 0.5 MG/DL (ref 0.3–1.2)
BNP SERPL-MCNC: 1581 PG/ML (ref 0–100)
BUN BLD-MCNC: 32 MG/DL (ref 9–23)
BUN/CREAT SERPL: 18.6 (ref 7–25)
CALCIUM SPEC-SCNC: 8.8 MG/DL (ref 8.7–10.4)
CHLORIDE SERPL-SCNC: 106 MMOL/L (ref 99–109)
CO2 SERPL-SCNC: 28 MMOL/L (ref 20–31)
CREAT BLD-MCNC: 1.72 MG/DL (ref 0.6–1.3)
D-LACTATE SERPL-SCNC: 1 MMOL/L (ref 0.5–2)
DEPRECATED RDW RBC AUTO: 43.1 FL (ref 37–54)
EOSINOPHIL # BLD AUTO: 0.09 10*3/MM3 (ref 0–0.3)
EOSINOPHIL NFR BLD AUTO: 0.8 % (ref 0–3)
ERYTHROCYTE [DISTWIDTH] IN BLOOD BY AUTOMATED COUNT: 13.8 % (ref 11.3–14.5)
FLUAV SUBTYP SPEC NAA+PROBE: NOT DETECTED
FLUBV RNA ISLT QL NAA+PROBE: NOT DETECTED
GFR SERPL CREATININE-BSD FRML MDRD: 40 ML/MIN/1.73
GLOBULIN UR ELPH-MCNC: 2.4 GM/DL
GLUCOSE BLD-MCNC: 120 MG/DL (ref 70–100)
HBA1C MFR BLD: 5.7 % (ref 4.8–5.6)
HCT VFR BLD AUTO: 41.2 % (ref 38.9–50.9)
HGB BLD-MCNC: 13.2 G/DL (ref 13.1–17.5)
HOLD SPECIMEN: NORMAL
HOLD SPECIMEN: NORMAL
IMM GRANULOCYTES # BLD AUTO: 0.03 10*3/MM3 (ref 0–0.03)
IMM GRANULOCYTES NFR BLD AUTO: 0.3 % (ref 0–0.6)
LEFT ATRIUM VOLUME INDEX: 46.9 ML/M^2
LEFT ATRIUM VOLUME: 99 ML
LEFT KIDNEY WIDTH: 5.6 CM
LV EF 2D ECHO EST: 55 %
LYMPHOCYTES # BLD AUTO: 0.94 10*3/MM3 (ref 0.6–4.8)
LYMPHOCYTES NFR BLD AUTO: 7.9 % (ref 24–44)
MAXIMAL PREDICTED HEART RATE: 156 BPM
MCH RBC QN AUTO: 27.4 PG (ref 27–31)
MCHC RBC AUTO-ENTMCNC: 32 G/DL (ref 32–36)
MCV RBC AUTO: 85.5 FL (ref 80–99)
MONOCYTES # BLD AUTO: 0.77 10*3/MM3 (ref 0–1)
MONOCYTES NFR BLD AUTO: 6.5 % (ref 0–12)
NEUTROPHILS # BLD AUTO: 10.02 10*3/MM3 (ref 1.5–8.3)
NEUTROPHILS NFR BLD AUTO: 84.2 % (ref 41–71)
PLATELET # BLD AUTO: 256 10*3/MM3 (ref 150–450)
PMV BLD AUTO: 12.3 FL (ref 6–12)
POTASSIUM BLD-SCNC: 4 MMOL/L (ref 3.5–5.5)
PROT SERPL-MCNC: 6.3 G/DL (ref 5.7–8.2)
RBC # BLD AUTO: 4.82 10*6/MM3 (ref 4.2–5.76)
SODIUM BLD-SCNC: 139 MMOL/L (ref 132–146)
STRESS TARGET HR: 133 BPM
TROPONIN I SERPL-MCNC: 0.43 NG/ML (ref 0–0.07)
TROPONIN I SERPL-MCNC: 0.49 NG/ML (ref 0–0.07)
WBC NRBC COR # BLD: 11.88 10*3/MM3 (ref 3.5–10.8)
WHOLE BLOOD HOLD SPECIMEN: NORMAL
WHOLE BLOOD HOLD SPECIMEN: NORMAL

## 2019-02-11 PROCEDURE — 84244 ASSAY OF RENIN: CPT | Performed by: NURSE PRACTITIONER

## 2019-02-11 PROCEDURE — 83605 ASSAY OF LACTIC ACID: CPT | Performed by: EMERGENCY MEDICINE

## 2019-02-11 PROCEDURE — 82088 ASSAY OF ALDOSTERONE: CPT | Performed by: NURSE PRACTITIONER

## 2019-02-11 PROCEDURE — 83880 ASSAY OF NATRIURETIC PEPTIDE: CPT | Performed by: EMERGENCY MEDICINE

## 2019-02-11 PROCEDURE — 83036 HEMOGLOBIN GLYCOSYLATED A1C: CPT | Performed by: NURSE PRACTITIONER

## 2019-02-11 PROCEDURE — 94640 AIRWAY INHALATION TREATMENT: CPT

## 2019-02-11 PROCEDURE — 93306 TTE W/DOPPLER COMPLETE: CPT | Performed by: INTERNAL MEDICINE

## 2019-02-11 PROCEDURE — 93975 VASCULAR STUDY: CPT | Performed by: INTERNAL MEDICINE

## 2019-02-11 PROCEDURE — 80053 COMPREHEN METABOLIC PANEL: CPT | Performed by: EMERGENCY MEDICINE

## 2019-02-11 PROCEDURE — 93005 ELECTROCARDIOGRAM TRACING: CPT | Performed by: EMERGENCY MEDICINE

## 2019-02-11 PROCEDURE — 84484 ASSAY OF TROPONIN QUANT: CPT

## 2019-02-11 PROCEDURE — 94799 UNLISTED PULMONARY SVC/PX: CPT

## 2019-02-11 PROCEDURE — 85025 COMPLETE CBC W/AUTO DIFF WBC: CPT | Performed by: EMERGENCY MEDICINE

## 2019-02-11 PROCEDURE — 93306 TTE W/DOPPLER COMPLETE: CPT

## 2019-02-11 PROCEDURE — 99285 EMERGENCY DEPT VISIT HI MDM: CPT

## 2019-02-11 PROCEDURE — 99223 1ST HOSP IP/OBS HIGH 75: CPT | Performed by: INTERNAL MEDICINE

## 2019-02-11 PROCEDURE — 87502 INFLUENZA DNA AMP PROBE: CPT | Performed by: NURSE PRACTITIONER

## 2019-02-11 PROCEDURE — 93975 VASCULAR STUDY: CPT

## 2019-02-11 PROCEDURE — 25010000002 FUROSEMIDE PER 20 MG: Performed by: NURSE PRACTITIONER

## 2019-02-11 PROCEDURE — 25010000002 HEPARIN (PORCINE) PER 1000 UNITS: Performed by: NURSE PRACTITIONER

## 2019-02-11 PROCEDURE — 83835 ASSAY OF METANEPHRINES: CPT | Performed by: NURSE PRACTITIONER

## 2019-02-11 PROCEDURE — 71045 X-RAY EXAM CHEST 1 VIEW: CPT

## 2019-02-11 RX ORDER — ATORVASTATIN CALCIUM 20 MG/1
20 TABLET, FILM COATED ORAL DAILY
Status: DISCONTINUED | OUTPATIENT
Start: 2019-02-12 | End: 2019-02-14 | Stop reason: HOSPADM

## 2019-02-11 RX ORDER — NITROGLYCERIN 20 MG/100ML
5-200 INJECTION INTRAVENOUS
Status: DISCONTINUED | OUTPATIENT
Start: 2019-02-11 | End: 2019-02-14 | Stop reason: HOSPADM

## 2019-02-11 RX ORDER — CLONIDINE HYDROCHLORIDE 0.1 MG/1
0.1 TABLET ORAL EVERY 8 HOURS SCHEDULED
Status: DISCONTINUED | OUTPATIENT
Start: 2019-02-11 | End: 2019-02-12

## 2019-02-11 RX ORDER — LABETALOL HYDROCHLORIDE 5 MG/ML
10 INJECTION, SOLUTION INTRAVENOUS ONCE
Status: COMPLETED | OUTPATIENT
Start: 2019-02-11 | End: 2019-02-11

## 2019-02-11 RX ORDER — CARVEDILOL 12.5 MG/1
25 TABLET ORAL EVERY 12 HOURS SCHEDULED
Status: DISCONTINUED | OUTPATIENT
Start: 2019-02-11 | End: 2019-02-14 | Stop reason: HOSPADM

## 2019-02-11 RX ORDER — HEPARIN SODIUM 5000 [USP'U]/ML
5000 INJECTION, SOLUTION INTRAVENOUS; SUBCUTANEOUS EVERY 8 HOURS SCHEDULED
Status: DISCONTINUED | OUTPATIENT
Start: 2019-02-11 | End: 2019-02-14 | Stop reason: HOSPADM

## 2019-02-11 RX ORDER — LABETALOL HYDROCHLORIDE 5 MG/ML
10 INJECTION, SOLUTION INTRAVENOUS ONCE
Status: DISCONTINUED | OUTPATIENT
Start: 2019-02-11 | End: 2019-02-11

## 2019-02-11 RX ORDER — CARVEDILOL 12.5 MG/1
12.5 TABLET ORAL 2 TIMES DAILY WITH MEALS
COMMUNITY
End: 2019-02-14 | Stop reason: HOSPADM

## 2019-02-11 RX ORDER — METOLAZONE 2.5 MG/1
5 TABLET ORAL ONCE
Status: COMPLETED | OUTPATIENT
Start: 2019-02-11 | End: 2019-02-11

## 2019-02-11 RX ORDER — ASPIRIN 81 MG/1
81 TABLET, CHEWABLE ORAL DAILY
Status: DISCONTINUED | OUTPATIENT
Start: 2019-02-12 | End: 2019-02-14 | Stop reason: HOSPADM

## 2019-02-11 RX ORDER — SODIUM CHLORIDE 0.9 % (FLUSH) 0.9 %
10 SYRINGE (ML) INJECTION AS NEEDED
Status: DISCONTINUED | OUTPATIENT
Start: 2019-02-11 | End: 2019-02-14 | Stop reason: HOSPADM

## 2019-02-11 RX ORDER — IPRATROPIUM BROMIDE AND ALBUTEROL SULFATE 2.5; .5 MG/3ML; MG/3ML
3 SOLUTION RESPIRATORY (INHALATION)
Status: DISCONTINUED | OUTPATIENT
Start: 2019-02-11 | End: 2019-02-14 | Stop reason: HOSPADM

## 2019-02-11 RX ORDER — ISOSORBIDE MONONITRATE 60 MG/1
60 TABLET, EXTENDED RELEASE ORAL DAILY
Status: DISCONTINUED | OUTPATIENT
Start: 2019-02-12 | End: 2019-02-11

## 2019-02-11 RX ORDER — NIFEDIPINE 60 MG/1
60 TABLET, EXTENDED RELEASE ORAL DAILY
COMMUNITY
End: 2020-11-10

## 2019-02-11 RX ORDER — ASPIRIN 325 MG
325 TABLET ORAL DAILY
COMMUNITY
End: 2019-02-14 | Stop reason: HOSPADM

## 2019-02-11 RX ORDER — FUROSEMIDE 10 MG/ML
40 INJECTION INTRAMUSCULAR; INTRAVENOUS EVERY 12 HOURS
Status: DISCONTINUED | OUTPATIENT
Start: 2019-02-11 | End: 2019-02-13

## 2019-02-11 RX ADMIN — CLONIDINE HYDROCHLORIDE 0.1 MG: 0.1 TABLET ORAL at 21:08

## 2019-02-11 RX ADMIN — FUROSEMIDE 40 MG: 10 INJECTION, SOLUTION INTRAMUSCULAR; INTRAVENOUS at 23:23

## 2019-02-11 RX ADMIN — NICARDIPINE HYDROCHLORIDE 5 MG/HR: 0.1 INJECTION, SOLUTION INTRAVENOUS at 15:28

## 2019-02-11 RX ADMIN — FUROSEMIDE 40 MG: 10 INJECTION, SOLUTION INTRAMUSCULAR; INTRAVENOUS at 11:30

## 2019-02-11 RX ADMIN — IPRATROPIUM BROMIDE AND ALBUTEROL SULFATE 3 ML: 2.5; .5 SOLUTION RESPIRATORY (INHALATION) at 18:40

## 2019-02-11 RX ADMIN — NITROGLYCERIN 5 MCG/MIN: 20 INJECTION INTRAVENOUS at 07:57

## 2019-02-11 RX ADMIN — IPRATROPIUM BROMIDE AND ALBUTEROL SULFATE 3 ML: 2.5; .5 SOLUTION RESPIRATORY (INHALATION) at 11:30

## 2019-02-11 RX ADMIN — NICARDIPINE HYDROCHLORIDE 5 MG/HR: 0.1 INJECTION, SOLUTION INTRAVENOUS at 11:35

## 2019-02-11 RX ADMIN — IPRATROPIUM BROMIDE AND ALBUTEROL SULFATE 3 ML: 2.5; .5 SOLUTION RESPIRATORY (INHALATION) at 15:41

## 2019-02-11 RX ADMIN — NITROGLYCERIN 2 INCH: 20 OINTMENT TOPICAL at 19:28

## 2019-02-11 RX ADMIN — NITROGLYCERIN 2 INCH: 20 OINTMENT TOPICAL at 11:33

## 2019-02-11 RX ADMIN — CARVEDILOL 25 MG: 12.5 TABLET, FILM COATED ORAL at 11:32

## 2019-02-11 RX ADMIN — NICARDIPINE HYDROCHLORIDE 5 MG/HR: 0.1 INJECTION, SOLUTION INTRAVENOUS at 19:25

## 2019-02-11 RX ADMIN — CARVEDILOL 25 MG: 12.5 TABLET, FILM COATED ORAL at 21:08

## 2019-02-11 RX ADMIN — HEPARIN SODIUM 5000 UNITS: 5000 INJECTION INTRAVENOUS; SUBCUTANEOUS at 14:22

## 2019-02-11 RX ADMIN — METOLAZONE 5 MG: 2.5 TABLET ORAL at 11:32

## 2019-02-11 RX ADMIN — CLONIDINE HYDROCHLORIDE 0.1 MG: 0.1 TABLET ORAL at 16:45

## 2019-02-11 RX ADMIN — LABETALOL 20 MG/4 ML (5 MG/ML) INTRAVENOUS SYRINGE 10 MG: at 10:34

## 2019-02-11 RX ADMIN — HEPARIN SODIUM 5000 UNITS: 5000 INJECTION INTRAVENOUS; SUBCUTANEOUS at 21:08

## 2019-02-11 NOTE — ED PROVIDER NOTES
Subjective   Rony He is a 63 y.o.male who presents to the ED with complaints of shortness of breath. The patient reports his shortness of breath has been constant for the past four days. He states his shortness of breath is worse when he is lying supine. He has not taken any medication for his discomfort. He also complains of a cough producing a white sputum and diaphoresis, but denies any fever, chest pain, leg swelling, vomiting, or nausea. There are no other complaints at this time.         History provided by:  Patient  Shortness of Breath   Severity:  Moderate  Onset quality:  Sudden  Duration:  4 days  Timing:  Constant  Progression:  Unchanged  Chronicity:  New  Relieved by:  None tried  Exacerbated by: lying supine.  Ineffective treatments:  None tried  Associated symptoms: cough and diaphoresis    Associated symptoms: no chest pain, no fever and no vomiting    Cough:     Cough characteristics:  Productive    Sputum characteristics:  White      Review of Systems   Constitutional: Positive for diaphoresis. Negative for fever.   Respiratory: Positive for cough and shortness of breath.    Cardiovascular: Negative for chest pain and leg swelling.   Gastrointestinal: Negative for nausea and vomiting.   All other systems reviewed and are negative.      Past Medical History:   Diagnosis Date   • Dyslipidemia    • Hypertension    • Probable coronary artery disease        Allergies   Allergen Reactions   • Amlodipine Swelling   • Penicillins        Past Surgical History:   Procedure Laterality Date   • HERNIA REPAIR      X 2       Family History   Problem Relation Age of Onset   • Hypertension Father    • Heart attack Father    • Diabetes Father        Social History     Socioeconomic History   • Marital status:      Spouse name: Not on file   • Number of children: Not on file   • Years of education: Not on file   • Highest education level: Not on file   Tobacco Use   • Smoking status: Former Smoker      Last attempt to quit: 2005     Years since quittin.1   • Smokeless tobacco: Never Used   Substance and Sexual Activity   • Alcohol use: No   • Drug use: No   • Sexual activity: Defer         Objective   Physical Exam   Constitutional: He is oriented to person, place, and time. He appears well-developed and well-nourished. No distress.   HENT:   Head: Normocephalic and atraumatic.   Nose: Nose normal.   Eyes: Conjunctivae are normal. No scleral icterus.   Neck: Normal range of motion. Neck supple.   Cardiovascular: Normal rate, regular rhythm, normal heart sounds and intact distal pulses.   No murmur heard.  Pulmonary/Chest: No respiratory distress. He has rales.   Minimal crackles to the lower lobes. Mild increased work up breathing.    Abdominal: Soft. Bowel sounds are normal. There is no tenderness.   Musculoskeletal: Normal range of motion. He exhibits edema.   1+ edema bilateral lower extremities.    Neurological: He is alert and oriented to person, place, and time.   Skin: Skin is warm and dry.   Psychiatric: He has a normal mood and affect. His behavior is normal.   Nursing note and vitals reviewed.      Critical Care  Performed by: Josué Hutchison DO  Authorized by: Josué Hutchison DO     Critical care provider statement:     Critical care time (minutes):  35    Critical care time was exclusive of:  Separately billable procedures and treating other patients    Critical care was necessary to treat or prevent imminent or life-threatening deterioration of the following conditions:  Cardiac failure    Critical care was time spent personally by me on the following activities:  Development of treatment plan with patient or surrogate, discussions with consultants, evaluation of patient's response to treatment, examination of patient, review of old charts, re-evaluation of patient's condition, pulse oximetry, ordering and review of radiographic studies, ordering and review of laboratory studies, ordering and  performing treatments and interventions and obtaining history from patient or surrogate    I assumed direction of critical care for this patient from another provider in my specialty: no                 ED Course  ED Course as of Feb 11 1154   Mon Feb 11, 2019   0842 I discussed the case with Dr. Montez, cardiology.  He agrees with the current treatment plan.  He recommends holding the Heparin for now.  Cardiology will come down to see the patient in the ED shortly.  [CP]      ED Course User Index  [CP] Josué Hutchison,      Recent Results (from the past 24 hour(s))   POC Troponin, Rapid    Collection Time: 02/11/19  6:56 AM   Result Value Ref Range    Troponin I 0.43 (H) 0.00 - 0.07 ng/mL   Comprehensive Metabolic Panel    Collection Time: 02/11/19  7:01 AM   Result Value Ref Range    Glucose 120 (H) 70 - 100 mg/dL    BUN 32 (H) 9 - 23 mg/dL    Creatinine 1.72 (H) 0.60 - 1.30 mg/dL    Sodium 139 132 - 146 mmol/L    Potassium 4.0 3.5 - 5.5 mmol/L    Chloride 106 99 - 109 mmol/L    CO2 28.0 20.0 - 31.0 mmol/L    Calcium 8.8 8.7 - 10.4 mg/dL    Total Protein 6.3 5.7 - 8.2 g/dL    Albumin 3.90 3.20 - 4.80 g/dL    ALT (SGPT) 14 7 - 40 U/L    AST (SGOT) 18 0 - 33 U/L    Alkaline Phosphatase 81 25 - 100 U/L    Total Bilirubin 0.5 0.3 - 1.2 mg/dL    eGFR Non African Amer 40 (L) >60 mL/min/1.73    Globulin 2.4 gm/dL    A/G Ratio 1.6 1.5 - 2.5 g/dL    BUN/Creatinine Ratio 18.6 7.0 - 25.0    Anion Gap 5.0 3.0 - 11.0 mmol/L   BNP    Collection Time: 02/11/19  7:01 AM   Result Value Ref Range    BNP 1,581.0 (H) 0.0 - 100.0 pg/mL   Light Blue Top    Collection Time: 02/11/19  7:01 AM   Result Value Ref Range    Extra Tube hold for add-on    Green Top (Gel)    Collection Time: 02/11/19  7:01 AM   Result Value Ref Range    Extra Tube Hold for add-ons.    Lavender Top    Collection Time: 02/11/19  7:01 AM   Result Value Ref Range    Extra Tube hold for add-on    Gold Top - SST    Collection Time: 02/11/19  7:01 AM   Result Value Ref  Range    Extra Tube Hold for add-ons.    CBC Auto Differential    Collection Time: 02/11/19  7:01 AM   Result Value Ref Range    WBC 11.88 (H) 3.50 - 10.80 10*3/mm3    RBC 4.82 4.20 - 5.76 10*6/mm3    Hemoglobin 13.2 13.1 - 17.5 g/dL    Hematocrit 41.2 38.9 - 50.9 %    MCV 85.5 80.0 - 99.0 fL    MCH 27.4 27.0 - 31.0 pg    MCHC 32.0 32.0 - 36.0 g/dL    RDW 13.8 11.3 - 14.5 %    RDW-SD 43.1 37.0 - 54.0 fl    MPV 12.3 (H) 6.0 - 12.0 fL    Platelets 256 150 - 450 10*3/mm3    Neutrophil % 84.2 (H) 41.0 - 71.0 %    Lymphocyte % 7.9 (L) 24.0 - 44.0 %    Monocyte % 6.5 0.0 - 12.0 %    Eosinophil % 0.8 0.0 - 3.0 %    Basophil % 0.3 0.0 - 1.0 %    Immature Grans % 0.3 0.0 - 0.6 %    Neutrophils, Absolute 10.02 (H) 1.50 - 8.30 10*3/mm3    Lymphocytes, Absolute 0.94 0.60 - 4.80 10*3/mm3    Monocytes, Absolute 0.77 0.00 - 1.00 10*3/mm3    Eosinophils, Absolute 0.09 0.00 - 0.30 10*3/mm3    Basophils, Absolute 0.03 0.00 - 0.20 10*3/mm3    Immature Grans, Absolute 0.03 0.00 - 0.03 10*3/mm3   Lactic Acid, Plasma    Collection Time: 02/11/19  7:24 AM   Result Value Ref Range    Lactate 1.0 0.5 - 2.0 mmol/L   POC Troponin, Rapid    Collection Time: 02/11/19  8:02 AM   Result Value Ref Range    Troponin I 0.49 (H) 0.00 - 0.07 ng/mL   Hemoglobin A1c    Collection Time: 02/11/19 11:28 AM   Result Value Ref Range    Hemoglobin A1C 5.70 (H) 4.80 - 5.60 %     Note: In addition to lab results from this visit, the labs listed above may include labs taken at another facility or during a different encounter within the last 24 hours. Please correlate lab times with ED admission and discharge times for further clarification of the services performed during this visit.    XR Chest 1 View   Final Result   No acute findings.       THIS DOCUMENT HAS BEEN ELECTRONICALLY SIGNED BY MARISELA ROMERO MD        Vitals:    02/11/19 1100 02/11/19 1132 02/11/19 1135 02/11/19 1139   BP: (!) 190/125 (!) 177/121     BP Location:       Patient Position:       Pulse:   64 74 72   Resp:       Temp:       SpO2:   93% 97%   Weight:       Height:         Medications   sodium chloride 0.9 % flush 10 mL (not administered)   nitroglycerin 50 mg/250 mL (0.2 mg/mL) infusion (0 mcg/min Intravenous Stopped 2/11/19 1116)   ipratropium-albuterol (DUO-NEB) nebulizer solution 3 mL (3 mL Nebulization Given 2/11/19 1130)   carvedilol (COREG) tablet 25 mg (25 mg Oral Given 2/11/19 1132)   furosemide (LASIX) injection 40 mg (40 mg Intravenous Given 2/11/19 1130)   NIFEdipine XL (PROCARDIA XL) 24 hr tablet 90 mg (not administered)   niCARdipine (CARDENE-IV) 20 mg/200 mL (0.1 mg/mL) in 0.9% NaCl infusion (5 mg/hr Intravenous New Bag 2/11/19 1135)   heparin (porcine) 5000 UNIT/ML injection 5,000 Units (not administered)   nitroglycerin (NITROSTAT) ointment 2 inch (2 inches Topical Given 2/11/19 1133)   labetalol (NORMODYNE,TRANDATE) injection 10 mg (10 mg Intravenous Given 2/11/19 1034)   metOLazone (ZAROXOLYN) tablet 5 mg (5 mg Oral Given 2/11/19 1132)     ECG/EMG Results (last 24 hours)     Procedure Component Value Units Date/Time    ECG 12 Lead [[id number]] Collected:  02/11/19 0650     Updated:  02/11/19 0651        ECG 12 Lead         ECG 12 Lead                             MDM  Number of Diagnoses or Management Options     Amount and/or Complexity of Data Reviewed  Clinical lab tests: reviewed  Tests in the radiology section of CPT®: reviewed  Tests in the medicine section of CPT®: reviewed  Decide to obtain previous medical records or to obtain history from someone other than the patient: yes    Critical Care  Total time providing critical care: 30-74 minutes      Final diagnoses:   Chest pain, unspecified type   Elevated troponin   Hypertensive emergency       Documentation assistance provided by isidoro Bain.  Information recorded by the isidoro was done at my direction and has been verified and validated by me.     Edgardo Bain  02/11/19 0711       Edgardo Bain  02/11/19  0721       Burgess Edgardo  02/11/19 0905       , Edgardo  02/11/19 1154       Josué Hutchison DO  02/12/19 1414

## 2019-02-11 NOTE — H&P
Rony He  [id number]  1955   LOS: 0 days   PHYSICIAN: None  CARDIOLOGIST: Job Wood MD, [facility]     Mr. He is a 63-year-old  white male from [place], [place], local [facility] [employer].    Chief Complaint:  Hypertensive urgency    Problem List:  1. Hypertensive urgency with /130 on admission 19  2. Coronary artery disease:  a. Stress test : EKG stress: 13 METs with no arrhythmias.  b. Cardiolite stress test  : inferior scar, EF 53%.  c. [facility] : [place] reportedly w anterior MI - data def  d. Echocardiogram 17: LVEF 0.40-0.45, lateral and inferior wall akinesis, severe concentric LVH (>292gm). Basal inferior aneurysm present, LVEDP borderline dilated  e. MPS 2017: Inferior scar and no evidence for ischemia, LVEF 35%  3. Dyslipidemia; on statin therapy.   4. Hypertension -accelerated with [facility] 2 day hospitalization 2017 for bp>300 systolic in the setting of flash pulmonary edema, with renal US, urine metanephrines, and TSH WNL at [facility]  5. Congestive Heart Failure  a. Echocardiogram : severe LVH EF 40-45%- [facility]  b. Echocardiogram  for flash pulm edema [facility]  systolic  c. Echocardiogram  in [place]: LVEF 45%  d. CCS class I chest discomfort/NYHA class IV CHF symptoms, BNP 1581 19  6. Remote appendectomy and inguinal herniorrhaphy x2.  7. Remote tobacco use, with recurrent use (0.5ppd 2019)  8. Chronic Kidney Disease stage III, creatinine 1.9 in 2017; discontinuation of acei/diuretic then.  9. Family history of early CAD with his father  at 55 with an MI  10. Prediabetes with HgbA1C 6.1% 2017       Allergies   Allergen Reactions   • Amlodipine Swelling   • Penicillins        (Not in a hospital admission)  Scheduled Meds:   Continuous Infusions:  nitroglycerin 5-200 mcg/min Last Rate: 50 mcg/min (19 0955)     PRN Meds:.sodium chloride       History of Present Illness:    This is a 63-year-old white male who presents to [facility] ED 19 with blood pressure 269/130 on admission in the setting of course chest congestion, nonproductive cough, and weakness for the past 72 hours.  He was given 10 mg of IV labetalol  and started on nitroglycerin GTT that was subsequently up-titrated to 70mcg/min.  Since the evening of 2019 he has noticed more shortness of breath, orthopnea, and sputum production, but denies any lower extremity edema, chest pain, palpitations, presyncope, or syncope.  He did not have any diuretics at home but has been compliant in all of his other cardiac medications.  BNP was 1581 on admission.  He denies any chills but has had a mild fever. He drove himself here this morning when his breathing continued to worsen. He is smoking 0.5ppd. He did not check his blood pressure at home before driving here but has had problems with his HTN in the past. He does not have a family physician.    Cardiac risk factors: advanced age (older than 55 for men, 65 for women), dyslipidemia, hypertension and male gender., prediabetes    Social History     Socioeconomic History   • Marital status:      Spouse name: Not on file   • Number of children: Not on file   • Years of education: Not on file   • Highest education level: Not on file   Social Needs   • Financial resource strain: Not on file   • Food insecurity - worry: Not on file   • Food insecurity - inability: Not on file   • Transportation needs - medical: Not on file   • Transportation needs - non-medical: Not on file   Occupational History   • Not on file   Tobacco Use   • Smoking status: Former Smoker     Last attempt to quit: 2005     Years since quittin.1   • Smokeless tobacco: Never Used   Substance and Sexual Activity   • Alcohol use: No   • Drug use: No   • Sexual activity: Defer   Other Topics Concern   • Not on file   Social History Narrative   • Not on file     Family History   Problem Relation Age of  "Onset   • Hypertension Father    • Heart attack Father    • Diabetes Father        Review of Systems  10 point review of systems was completed, positives outlined in the HPI, and otherwise all other systems are negative.      Objective:       Physical Exam  BP (!) 187/113   Pulse 73   Temp 97.9 °F (36.6 °C)   Resp 18   Ht 177.8 cm (70\")   Wt 93 kg (205 lb)   SpO2 96%   BMI 29.41 kg/m²       19  0642   Weight: 93 kg (205 lb)     Body mass index is 29.41 kg/m².  No intake or output data in the 24 hours ending 19 1002    General Appearance:  Alert, cooperative, no distress, appears stated age   Head:  Normocephalic, without obvious abnormality, atraumatic   Neck: Supple, symmetrical, trachea midline, no adenopathy, thyroid: not enlarged, symmetric, no tenderness/mass/nodules, no carotid bruit or JVD   Lungs:   Bibasilar rales to auscultation bilaterally, respirations unlabored   Heart:  Regular rate and rhythm, S1, S2 normal, grade 2/6 murmur, rub or gallop   Abdomen:   Soft, non-tender, no masses, no organomegaly, bowel sounds audible x4   Extremities: No edema, normal range of motion   Pulses: 1+ and symmetric   Skin: Skin color, texture, turgor normal, no rashes or lesions   Neurologic: Normal       Cardiographics:    EK19:  Normal sinus rhythm  Left ventricular hypertrophy with repolarization abnormality  Abnormal ECG  When compared with ECG of 2019 06:50, (Unconfirmed)  premature atrial complexes are no longer present; REVIEWED.    Imaging:     · Chest x-ray: 19:No acute findings; REVIEWED.    Lab Review:     Results from last 7 days   Lab Units 19  0701   SODIUM mmol/L 139   POTASSIUM mmol/L 4.0   CHLORIDE mmol/L 106   CO2 mmol/L 28.0   BUN mg/dL 32*   CREATININE mg/dL 1.72*   GLUCOSE mg/dL 120*   CALCIUM mg/dL 8.8     Results from last 7 days   Lab Units 19  0701   WBC 10*3/mm3 11.88*   HEMOGLOBIN g/dL 13.2   HEMATOCRIT % 41.2   PLATELETS 10*3/mm3 256     · BNP " "1581  · NTG gtt 70 mcg/min  · Lipid panel 2/7/17 ([facility]): Cholesterol 239, HDL 33, , triglycerides 138  · Urine metanephrines([facility]): 143, normetanephrine 685, total metanephrines 828, creatinine random urine 90  · Renal artery duplex 2/8/17 ([facility]): No evidence of renal artery stenosis bilaterally      Assessment:   Patient with hypertensive urgency in the setting of acute heart failure and marginal renal function. We will admit for treatment to rule out adrenal hyperplasia, renal artery stenosis, pheochromocytoma as causes of his malignant hypertension. He needs a nephrology consultation and eventually we will repeat an ischemic evaluation.  Doubt acute coronary syndrome currently.     Plan:   1. IV lasix 40mg bid  2. Zaroxolyn 5 mg x once  3. Coreg 25mg bid  4. Defer ACEI/ARB at this time  5. Continue NTG gtt until bp <130systolic  6. Strict I/O, daily weights  7. BMP, ECG, magnesium level in morning  9. Defer MPS/LHC currently but eventually will need repeat ischemic evaluation preferrably with diagnostic coronary angiography  10. Admit patient  11. Cardiac diet  12. Echocardiogram  13. Renal artery duplex  14. Increase nifedipine XL to 90mg daily  15. Plasma metanephrines, aldosterone level, PRA  16. Nephrology consultation for progressive hypertension and proteinuria and progressive CKD  17. May need a trial of Entresto in view of LVEF 0.35 in 2017  18. If persistent systolic BP >160 systolic add IV cardene  19. Heparin 5000 units SC tid  20. ECG, troponin, FLP, HgbA1C  in morning  21. 2\" NTG paste tid and discontinue NTG gtt  22. Influenza A and B screening  23. Duonebs q 4-6 hours PRN   24. Needs tobacco cessation; discussed in detail with patient    Scribed for Home Harmon MD by Cathleen Zarate, JOE. 2/11/2019  10:45 AM     I, Home Harmon MD, [facility], personally performed the services described in this documentation as scribed by the above named individual in my presence, and it is both accurate " and complete.

## 2019-02-11 NOTE — H&P (VIEW-ONLY)
Rony He  [id number]  1955   LOS: 0 days   PHYSICIAN: None  CARDIOLOGIST: Job Wood MD, [facility]     Mr. He is a 63-year-old  white male from [place], [place], local [facility] [employer].    Chief Complaint:  Hypertensive urgency    Problem List:  1. Hypertensive urgency with /130 on admission 19  2. Coronary artery disease:  a. Stress test : EKG stress: 13 METs with no arrhythmias.  b. Cardiolite stress test  : inferior scar, EF 53%.  c. [facility] : [place] reportedly w anterior MI - data def  d. Echocardiogram 17: LVEF 0.40–0.45, lateral and inferior wall akinesis, severe concentric LVH (>292gm). Basal inferior aneurysm present, LVEDP borderline dilated  e. MPS 2017: Inferior scar and no evidence for ischemia, LVEF 35%  3. Dyslipidemia; on statin therapy.   4. Hypertension -accelerated with [facility] 2 day hospitalization 2017 for bp>300 systolic in the setting of flash pulmonary edema, with renal US, urine metanephrines, and TSH WNL at [facility]  5. Congestive Heart Failure  a. Echocardiogram : severe LVH EF 40-45%- [facility]  b. Echocardiogram  for flash pulm edema [facility]  systolic  c. Echocardiogram  in [place]: LVEF 45%  d. CCS class I chest discomfort/NYHA class IV CHF symptoms, BNP 1581 19  6. Remote appendectomy and inguinal herniorrhaphy x2.  7. Remote tobacco use, with recurrent use (0.5ppd 2019)  8. Chronic Kidney Disease stage III, creatinine 1.9 in 2017; discontinuation of acei/diuretic then.  9. Family history of early CAD with his father  at 55 with an MI  10. Prediabetes with HgbA1C 6.1% 2017       Allergies   Allergen Reactions   • Amlodipine Swelling   • Penicillins        (Not in a hospital admission)  Scheduled Meds:   Continuous Infusions:  nitroglycerin 5-200 mcg/min Last Rate: 50 mcg/min (19 0955)     PRN Meds:.sodium chloride       History of Present Illness:    This is a 63-year-old white male who presents to [facility] ED 19 with blood pressure 269/130 on admission in the setting of course chest congestion, nonproductive cough, and weakness for the past 72 hours.  He was given 10 mg of IV labetalol  and started on nitroglycerin GTT that was subsequently up-titrated to 70mcg/min.  Since the evening of 2019 he has noticed more shortness of breath, orthopnea, and sputum production, but denies any lower extremity edema, chest pain, palpitations, presyncope, or syncope.  He did not have any diuretics at home but has been compliant in all of his other cardiac medications.  BNP was 1581 on admission.  He denies any chills but has had a mild fever. He drove himself here this morning when his breathing continued to worsen. He is smoking 0.5ppd. He did not check his blood pressure at home before driving here but has had problems with his HTN in the past. He does not have a family physician.    Cardiac risk factors: advanced age (older than 55 for men, 65 for women), dyslipidemia, hypertension and male gender., prediabetes    Social History     Socioeconomic History   • Marital status:      Spouse name: Not on file   • Number of children: Not on file   • Years of education: Not on file   • Highest education level: Not on file   Social Needs   • Financial resource strain: Not on file   • Food insecurity - worry: Not on file   • Food insecurity - inability: Not on file   • Transportation needs - medical: Not on file   • Transportation needs - non-medical: Not on file   Occupational History   • Not on file   Tobacco Use   • Smoking status: Former Smoker     Last attempt to quit: 2005     Years since quittin.1   • Smokeless tobacco: Never Used   Substance and Sexual Activity   • Alcohol use: No   • Drug use: No   • Sexual activity: Defer   Other Topics Concern   • Not on file   Social History Narrative   • Not on file     Family History   Problem Relation Age of  "Onset   • Hypertension Father    • Heart attack Father    • Diabetes Father        Review of Systems  10 point review of systems was completed, positives outlined in the HPI, and otherwise all other systems are negative.      Objective:       Physical Exam  BP (!) 187/113   Pulse 73   Temp 97.9 °F (36.6 °C)   Resp 18   Ht 177.8 cm (70\")   Wt 93 kg (205 lb)   SpO2 96%   BMI 29.41 kg/m²       19  0642   Weight: 93 kg (205 lb)     Body mass index is 29.41 kg/m².  No intake or output data in the 24 hours ending 19 1002    General Appearance:  Alert, cooperative, no distress, appears stated age   Head:  Normocephalic, without obvious abnormality, atraumatic   Neck: Supple, symmetrical, trachea midline, no adenopathy, thyroid: not enlarged, symmetric, no tenderness/mass/nodules, no carotid bruit or JVD   Lungs:   Bibasilar rales to auscultation bilaterally, respirations unlabored   Heart:  Regular rate and rhythm, S1, S2 normal, grade 2/6 murmur, rub or gallop   Abdomen:   Soft, non-tender, no masses, no organomegaly, bowel sounds audible x4   Extremities: No edema, normal range of motion   Pulses: 1+ and symmetric   Skin: Skin color, texture, turgor normal, no rashes or lesions   Neurologic: Normal       Cardiographics:    EK19:  Normal sinus rhythm  Left ventricular hypertrophy with repolarization abnormality  Abnormal ECG  When compared with ECG of 2019 06:50, (Unconfirmed)  premature atrial complexes are no longer present; REVIEWED.    Imaging:     · Chest x-ray: 19:No acute findings; REVIEWED.    Lab Review:     Results from last 7 days   Lab Units 19  0701   SODIUM mmol/L 139   POTASSIUM mmol/L 4.0   CHLORIDE mmol/L 106   CO2 mmol/L 28.0   BUN mg/dL 32*   CREATININE mg/dL 1.72*   GLUCOSE mg/dL 120*   CALCIUM mg/dL 8.8     Results from last 7 days   Lab Units 19  0701   WBC 10*3/mm3 11.88*   HEMOGLOBIN g/dL 13.2   HEMATOCRIT % 41.2   PLATELETS 10*3/mm3 256     · BNP " "1581  · NTG gtt 70 mcg/min  · Lipid panel 2/7/17 ([facility]): Cholesterol 239, HDL 33, , triglycerides 138  · Urine metanephrines([facility]): 143, normetanephrine 685, total metanephrines 828, creatinine random urine 90  · Renal artery duplex 2/8/17 ([facility]): No evidence of renal artery stenosis bilaterally      Assessment:   Patient with hypertensive urgency in the setting of acute heart failure and marginal renal function. We will admit for treatment to rule out adrenal hyperplasia, renal artery stenosis, pheochromocytoma as causes of his malignant hypertension. He needs a nephrology consultation and eventually we will repeat an ischemic evaluation.  Doubt acute coronary syndrome currently.     Plan:   1. IV lasix 40mg bid  2. Zaroxolyn 5 mg x once  3. Coreg 25mg bid  4. Defer ACEI/ARB at this time  5. Continue NTG gtt until bp <130systolic  6. Strict I/O, daily weights  7. BMP, ECG, magnesium level in morning  9. Defer MPS/LHC currently but eventually will need repeat ischemic evaluation preferrably with diagnostic coronary angiography  10. Admit patient  11. Cardiac diet  12. Echocardiogram  13. Renal artery duplex  14. Increase nifedipine XL to 90mg daily  15. Plasma metanephrines, aldosterone level, PRA  16. Nephrology consultation for progressive hypertension and proteinuria and progressive CKD  17. May need a trial of Entresto in view of LVEF 0.35 in 2017  18. If persistent systolic BP >160 systolic add IV cardene  19. Heparin 5000 units SC tid  20. ECG, troponin, FLP, HgbA1C  in morning  21. 2\" NTG paste tid and discontinue NTG gtt  22. Influenza A and B screening  23. Duonebs q 4-6 hours PRN   24. Needs tobacco cessation; discussed in detail with patient    Scribed for Home Harmon MD by Cathleen Zarate, JOE. 2/11/2019  10:45 AM     I, Home Harmon MD, [facility], personally performed the services described in this documentation as scribed by the above named individual in my presence, and it is both accurate " and complete.

## 2019-02-11 NOTE — PROGRESS NOTES
Imdur and nitro ointment both scheduled , called APRN and said to D/C zara Doe, [facility]  2/11/2019  3:40 PM

## 2019-02-12 ENCOUNTER — APPOINTMENT (OUTPATIENT)
Dept: ULTRASOUND IMAGING | Facility: HOSPITAL | Age: 64
End: 2019-02-12

## 2019-02-12 PROBLEM — R07.9 CHEST PAIN: Status: ACTIVE | Noted: 2019-02-12

## 2019-02-12 LAB
ANION GAP SERPL CALCULATED.3IONS-SCNC: 6 MMOL/L (ref 3–11)
ARTICHOKE IGE QN: 117 MG/DL (ref 0–130)
BUN BLD-MCNC: 34 MG/DL (ref 9–23)
BUN/CREAT SERPL: 17.3 (ref 7–25)
CALCIUM SPEC-SCNC: 8.8 MG/DL (ref 8.7–10.4)
CHLORIDE SERPL-SCNC: 103 MMOL/L (ref 99–109)
CHOLEST SERPL-MCNC: 163 MG/DL (ref 0–200)
CO2 SERPL-SCNC: 29 MMOL/L (ref 20–31)
CREAT BLD-MCNC: 1.96 MG/DL (ref 0.6–1.3)
CREAT UR-MCNC: 37.1 MG/DL
EOSINOPHIL SPEC QL MICRO: 0 % EOS/100 CELLS (ref 0–0)
GFR SERPL CREATININE-BSD FRML MDRD: 35 ML/MIN/1.73
GLUCOSE BLD-MCNC: 97 MG/DL (ref 70–100)
HDLC SERPL-MCNC: 28 MG/DL (ref 40–60)
MAGNESIUM SERPL-MCNC: 1.7 MG/DL (ref 1.3–2.7)
POTASSIUM BLD-SCNC: 3.4 MMOL/L (ref 3.5–5.5)
POTASSIUM BLD-SCNC: 4.1 MMOL/L (ref 3.5–5.5)
PROT UR-MCNC: 52 MG/DL (ref 1–14)
SODIUM BLD-SCNC: 138 MMOL/L (ref 132–146)
TRIGL SERPL-MCNC: 186 MG/DL (ref 0–150)
TROPONIN I SERPL-MCNC: 0.54 NG/ML

## 2019-02-12 PROCEDURE — 84156 ASSAY OF PROTEIN URINE: CPT | Performed by: INTERNAL MEDICINE

## 2019-02-12 PROCEDURE — 25010000002 HEPARIN (PORCINE) PER 1000 UNITS: Performed by: NURSE PRACTITIONER

## 2019-02-12 PROCEDURE — 76775 US EXAM ABDO BACK WALL LIM: CPT

## 2019-02-12 PROCEDURE — 84132 ASSAY OF SERUM POTASSIUM: CPT | Performed by: INTERNAL MEDICINE

## 2019-02-12 PROCEDURE — 80048 BASIC METABOLIC PNL TOTAL CA: CPT | Performed by: NURSE PRACTITIONER

## 2019-02-12 PROCEDURE — 94760 N-INVAS EAR/PLS OXIMETRY 1: CPT

## 2019-02-12 PROCEDURE — 94799 UNLISTED PULMONARY SVC/PX: CPT

## 2019-02-12 PROCEDURE — 25010000002 FUROSEMIDE PER 20 MG: Performed by: NURSE PRACTITIONER

## 2019-02-12 PROCEDURE — 99232 SBSQ HOSP IP/OBS MODERATE 35: CPT | Performed by: INTERNAL MEDICINE

## 2019-02-12 PROCEDURE — 80061 LIPID PANEL: CPT | Performed by: NURSE PRACTITIONER

## 2019-02-12 PROCEDURE — 87205 SMEAR GRAM STAIN: CPT | Performed by: INTERNAL MEDICINE

## 2019-02-12 PROCEDURE — 83735 ASSAY OF MAGNESIUM: CPT | Performed by: NURSE PRACTITIONER

## 2019-02-12 PROCEDURE — 82570 ASSAY OF URINE CREATININE: CPT | Performed by: INTERNAL MEDICINE

## 2019-02-12 PROCEDURE — 84484 ASSAY OF TROPONIN QUANT: CPT | Performed by: NURSE PRACTITIONER

## 2019-02-12 RX ORDER — POTASSIUM CHLORIDE 750 MG/1
40 CAPSULE, EXTENDED RELEASE ORAL AS NEEDED
Status: DISCONTINUED | OUTPATIENT
Start: 2019-02-12 | End: 2019-02-14 | Stop reason: HOSPADM

## 2019-02-12 RX ORDER — POTASSIUM CHLORIDE 1.5 G/1.77G
40 POWDER, FOR SOLUTION ORAL AS NEEDED
Status: DISCONTINUED | OUTPATIENT
Start: 2019-02-12 | End: 2019-02-14 | Stop reason: HOSPADM

## 2019-02-12 RX ORDER — POTASSIUM CHLORIDE 7.45 MG/ML
10 INJECTION INTRAVENOUS
Status: DISCONTINUED | OUTPATIENT
Start: 2019-02-12 | End: 2019-02-14 | Stop reason: HOSPADM

## 2019-02-12 RX ORDER — CLONIDINE HYDROCHLORIDE 0.1 MG/1
0.2 TABLET ORAL EVERY 8 HOURS SCHEDULED
Status: DISCONTINUED | OUTPATIENT
Start: 2019-02-12 | End: 2019-02-14 | Stop reason: HOSPADM

## 2019-02-12 RX ADMIN — CARVEDILOL 25 MG: 12.5 TABLET, FILM COATED ORAL at 22:20

## 2019-02-12 RX ADMIN — HEPARIN SODIUM 5000 UNITS: 5000 INJECTION INTRAVENOUS; SUBCUTANEOUS at 06:07

## 2019-02-12 RX ADMIN — NICARDIPINE HYDROCHLORIDE 5 MG/HR: 0.1 INJECTION, SOLUTION INTRAVENOUS at 20:27

## 2019-02-12 RX ADMIN — HEPARIN SODIUM 5000 UNITS: 5000 INJECTION INTRAVENOUS; SUBCUTANEOUS at 22:20

## 2019-02-12 RX ADMIN — CLONIDINE HYDROCHLORIDE 0.1 MG: 0.1 TABLET ORAL at 06:06

## 2019-02-12 RX ADMIN — ATORVASTATIN CALCIUM 20 MG: 20 TABLET, FILM COATED ORAL at 08:26

## 2019-02-12 RX ADMIN — NICARDIPINE HYDROCHLORIDE 5 MG/HR: 0.1 INJECTION, SOLUTION INTRAVENOUS at 16:17

## 2019-02-12 RX ADMIN — HEPARIN SODIUM 5000 UNITS: 5000 INJECTION INTRAVENOUS; SUBCUTANEOUS at 14:59

## 2019-02-12 RX ADMIN — IPRATROPIUM BROMIDE AND ALBUTEROL SULFATE 3 ML: 2.5; .5 SOLUTION RESPIRATORY (INHALATION) at 12:14

## 2019-02-12 RX ADMIN — CLONIDINE HYDROCHLORIDE 0.2 MG: 0.1 TABLET ORAL at 22:20

## 2019-02-12 RX ADMIN — CARVEDILOL 25 MG: 12.5 TABLET, FILM COATED ORAL at 08:26

## 2019-02-12 RX ADMIN — IPRATROPIUM BROMIDE AND ALBUTEROL SULFATE 3 ML: 2.5; .5 SOLUTION RESPIRATORY (INHALATION) at 17:19

## 2019-02-12 RX ADMIN — NITROGLYCERIN 2 INCH: 20 OINTMENT TOPICAL at 12:20

## 2019-02-12 RX ADMIN — NITROGLYCERIN 2 INCH: 20 OINTMENT TOPICAL at 06:06

## 2019-02-12 RX ADMIN — NICARDIPINE HYDROCHLORIDE 5 MG/HR: 0.1 INJECTION, SOLUTION INTRAVENOUS at 04:36

## 2019-02-12 RX ADMIN — FUROSEMIDE 40 MG: 10 INJECTION, SOLUTION INTRAMUSCULAR; INTRAVENOUS at 12:21

## 2019-02-12 RX ADMIN — IPRATROPIUM BROMIDE AND ALBUTEROL SULFATE 3 ML: 2.5; .5 SOLUTION RESPIRATORY (INHALATION) at 20:09

## 2019-02-12 RX ADMIN — NIFEDIPINE 90 MG: 60 TABLET, FILM COATED, EXTENDED RELEASE ORAL at 14:59

## 2019-02-12 RX ADMIN — ASPIRIN 81 MG 81 MG: 81 TABLET ORAL at 08:26

## 2019-02-12 RX ADMIN — NITROGLYCERIN 2 INCH: 20 OINTMENT TOPICAL at 18:05

## 2019-02-12 RX ADMIN — CLONIDINE HYDROCHLORIDE 0.2 MG: 0.1 TABLET ORAL at 14:59

## 2019-02-12 RX ADMIN — POTASSIUM CHLORIDE 40 MEQ: 750 CAPSULE, EXTENDED RELEASE ORAL at 08:26

## 2019-02-12 RX ADMIN — NICARDIPINE HYDROCHLORIDE 5 MG/HR: 0.1 INJECTION, SOLUTION INTRAVENOUS at 08:25

## 2019-02-12 RX ADMIN — POTASSIUM CHLORIDE 40 MEQ: 750 CAPSULE, EXTENDED RELEASE ORAL at 12:21

## 2019-02-12 RX ADMIN — NICARDIPINE HYDROCHLORIDE 5 MG/HR: 0.1 INJECTION, SOLUTION INTRAVENOUS at 12:20

## 2019-02-12 NOTE — PROGRESS NOTES
Rony FREED [facility]  [id number]  1955   LOS: 1 day   Patient Care Team:  Provider, No Known as PCP - General    Chief Complaint:  Follow up on HTN, SOB    Subjective      Patient is feeling much better this morning and denies increased SOB, edema, palpitations, orthopnea, or sputum production. His cough has subsided for the most part. He has some abdominal discomfort from not eating much since he has been in the hospital. Nephrology to see him today. Cardene was able to temporarily be discontinued last night but was reinitiated this morning.  He states remotely when he was on amlodipine he had urinary frequency/urgency as well as flank discomfort.    Objective     Vital Sign Min/Max for last 24 hours  Temp  Min: 98.4 °F (36.9 °C)  Max: 98.4 °F (36.9 °C)   BP  Min: 133/67  Max: 219/125   Pulse  Min: 55  Max: 88   Resp  Min: 18  Max: 19   SpO2  Min: 90 %  Max: 97 %      Weight  Min: 93 kg (205 lb)  Max: 93 kg (205 lb)         02/11/19  0642 02/11/19  1248   Weight: 93 kg (205 lb) 93 kg (205 lb)         Intake/Output Summary (Last 24 hours) at 2/12/2019 0741  Last data filed at 2/12/2019 0600  Gross per 24 hour   Intake --   Output 4165 ml   Net -4165 ml       Physical Exam:     General Appearance:    Alert, cooperative, in no acute distress   Lungs:     Clear to auscultation,respirations regular, even and                   Unlabored, 3 L O2 NC    Heart:    Regular and normal rate, normal S1 and S2, grade 1/6            murmur, no gallop, no rub, no click   Abdomen:  Extremities:   Soft, non-tender, bowel sounds audible x4    No edema, normal range of motion   Pulses:   Pulses palpable and equal bilaterally      Results Review:   Results from last 7 days   Lab Units 02/12/19  0426 02/11/19  0701   SODIUM mmol/L 138 139   POTASSIUM mmol/L 3.4* 4.0   CHLORIDE mmol/L 103 106   CO2 mmol/L 29.0 28.0   BUN mg/dL 34* 32*   CREATININE mg/dL 1.96* 1.72*   GLUCOSE mg/dL 97 120*   CALCIUM mg/dL 8.8 8.8     Results from last 7  days   Lab Units 02/11/19  0701   WBC 10*3/mm3 11.88*   HEMOGLOBIN g/dL 13.2   HEMATOCRIT % 41.2   PLATELETS 10*3/mm3 256     Results from last 7 days   Lab Units 02/12/19  0426   CHOLESTEROL mg/dL 163   TRIGLYCERIDES mg/dL 186*   HDL CHOL mg/dL 28*   LDL CHOL mg/dL 117     Results from last 7 days   Lab Units 02/11/19  1128   HEMOGLOBIN A1C % 5.70*     Results from last 7 days   Lab Units 02/12/19  0426   TROPONIN I ng/mL 0.539*   · Echocardiogram 2/11/19:  · Estimated EF = 55%.  · Left ventricular wall thickness is consistent with mild-to-moderate concentric hypertrophy.  · Left ventricular diastolic dysfunction (grade I) consistent with impaired relaxation.  · Mild aortic valve regurgitation is present.  · Mild aortic valve stenosis is present.  · Aortic valve mean pressure gradient is 14.4 mmHg.    · Renal artery duplex 2/11/19:  · Normal right renal artery.  · Left renal artery poorly visualized for a number of factors. However small left kidney, and very poor flow seen distally both suggest a hemodynamically significant left renal artery stenosis.    · No new ECG or chest x-ray to review.    Medication Review: REVIEWED; DRIP = IV cardene gtt 5 mg/hr.    Assessment/Plan      NSTEMI in the setting of uncontrolled hypertension and marginal renal function. Needs potassium replacement today.  Echocardiogram acceptable yesterday.  Renal artery duplex suggestive of left renal artery stenosis.  Influenza A and B were negative.  Patient will need Zetia on discharge.  Plasma metanephrines, PRA, and aldosterone not available yet to review. Awaiting nephrology recommendations. Will plan for IV Lexiscan PET cardiac CT stress scan tomorrow.  Will reassess GFR in a.m. and consider consolidating NTG 2% ointment to Imdur 120 mg every morning.  He may need to undergo bilateral CO2 renal angiography at some point.      Malignant hypertension    Dyslipidemia    Chronic systolic congestive heart failure (CMS/HCC)    Tobacco  use    Electronically signed by JOE Rosales, 02/12/19, 7:58 AM.    I, Home Harmon MD, [facility], personally performed the services described in this documentation as scribed by the above named individual in my presence, and it is both accurate and complete.     02/12/19  7:41 AM

## 2019-02-12 NOTE — PROGRESS NOTES
Discharge Planning Assessment  [facility]     Patient Name: Rony He  MRN: [id number]  Today's Date: 2/12/2019    Admit Date: 2/11/2019    Discharge Needs Assessment     Row Name 02/12/19 1332       Living Environment    Lives With  alone    Current Living Arrangements  home/apartment/condo    Primary Care Provided by  self    Able to Return to Prior Arrangements  yes       Transition Planning    Patient/Family Anticipates Transition to  home    Transportation Anticipated  car, drives self       Discharge Needs Assessment    Readmission Within the Last 30 Days  no previous admission in last 30 days    Concerns to be Addressed  no discharge needs identified;denies needs/concerns at this time    Equipment Currently Used at Home  none    Anticipated Changes Related to Illness  none    Equipment Needed After Discharge  none        Discharge Plan     Row Name 02/12/19 1332       Plan    Plan  home    Patient/Family in Agreement with Plan  yes    Plan Comments  I met with Mr. He at the bedside. He lives in [facility] alone. He works full time and is independent with all activities of daily living. He does not have a PCP. I have notified our transitions nurse Gabo Disla. She is going to schedule an appointment for him to establish care with a PCP who has weekend hours per Mr. He's request. He doesn't anticipate having any other discharge needs. Case management will follow.    Final Discharge Disposition Code  01 - home or self-care        Destination      No service coordination in this encounter.      Durable Medical Equipment      No service coordination in this encounter.      Dialysis/Infusion      No service coordination in this encounter.      Home Medical Care      No service coordination in this encounter.      Community Resources      No service coordination in this encounter.          Demographic Summary     Row Name 02/12/19 1330       General Information    Admission Type  inpatient     Referral Source  admission list    General Information Comments  I confirmed that Mr. He does not have a PCP. He states that he works late hours Monday-Friday and would like to find a PCP who has weekend hours. Julia Disla has been notified and will arrange an appointment with a PCP who is open on the weekend. This will be placed in the AVS.        Functional Status     Row Name 02/12/19 1331       Functional Status    Usual Activity Tolerance  excellent       Functional Status, IADL    Medications  independent    Meal Preparation  independent    Housekeeping  independent    Laundry  independent    Shopping  independent       Employment/    Employment Status  employed full time        Psychosocial    No documentation.       Abuse/Neglect    No documentation.       Legal    No documentation.       Substance Abuse    No documentation.       Patient Forms    No documentation.           Yordan Luis

## 2019-02-12 NOTE — CONSULTS
NAL Consult Note    Rony He  1955  [id number]    Date of Admit:  2019    Date of Consult: 2019        Requesting Provider: No ref. provider found  Evaluating Physician: James Starr MD        Reason for Consultation:  DOMO ON CKD. HTN.  History of present illness:    Patient is a 63 y.o.  Yr old male WITH A H/O ? CKD ( TOLD BY PCP A COUPLE OF YRS AG THAT  HE HAD ABNORMAL KIDNEY FXN AND WAS GOING TO SEND TO A NEPHROLOGIST BUT NEVER DID ), SEVERE HTN WHO WAS ADMITTED WITH ABNORMAL RENAL FXN AND MALIGNANT HTN ( /130 ) AND WE ARE CONSULTED. CREAT 1.72-1.96 PAST DAY. BASELINE UNKNOWN. HAS HAD SEVERE HTN FOR YRS. NO RECENT USE OF NSAIA'S.    Past Medical History:   Diagnosis Date   • Dyslipidemia    • Hypertension    • Probable coronary artery disease        Past Surgical History:   Procedure Laterality Date   • HERNIA REPAIR      X 2       Social History     Socioeconomic History   • Marital status:      Spouse name: Not on file   • Number of children: Not on file   • Years of education: Not on file   • Highest education level: Not on file   Tobacco Use   • Smoking status: Former Smoker     Last attempt to quit:      Years since quittin.1   • Smokeless tobacco: Never Used   Substance and Sexual Activity   • Alcohol use: No   • Drug use: No   • Sexual activity: Defer       family history includes Diabetes in his father; Heart attack in his father; Hypertension in his father. NO FH OF KIDNEY DZ.    Allergies   Allergen Reactions   • Amlodipine Swelling   • Penicillins        Medication:    Current Facility-Administered Medications:   •  aspirin chewable tablet 81 mg, 81 mg, Oral, Daily, Cathleen Zarate APRN, 81 mg at 19  •  atorvastatin (LIPITOR) tablet 20 mg, 20 mg, Oral, Daily, Cathleen Zarate APRN, 20 mg at 19  •  carvedilol (COREG) tablet 25 mg, 25 mg, Oral, Q12H, Cathleen Zarate APRN, 25 mg at 19  •  CloNIDine (CATAPRES)  tablet 0.2 mg, 0.2 mg, Oral, Q8H, Home Harmon MD  •  furosemide (LASIX) injection 40 mg, 40 mg, Intravenous, Q12H, Cathleen Zarate APRN, 40 mg at 02/11/19 2323  •  heparin (porcine) 5000 UNIT/ML injection 5,000 Units, 5,000 Units, Subcutaneous, Q8H, Cathleen Zarate APRN, 5,000 Units at 02/12/19 0607  •  ipratropium-albuterol (DUO-NEB) nebulizer solution 3 mL, 3 mL, Nebulization, 4x Daily - RT, Cathleen Zarate APRN, 3 mL at 02/11/19 1840  •  niCARdipine (CARDENE-IV) 20 mg/200 mL (0.1 mg/mL) in 0.9% NaCl infusion, 5-15 mg/hr, Intravenous, Titrated, Cathleen Zarate APRN, Last Rate: 50 mL/hr at 02/12/19 0825, 5 mg/hr at 02/12/19 0825  •  NIFEdipine XL (PROCARDIA XL) 24 hr tablet 90 mg, 90 mg, Oral, Q24H, Cathleen Zarate APRN  •  nitroglycerin (NITROSTAT) ointment 2 inch, 2 inch, Topical, Q6H, Cathleen Zarate APRN, 2 inch at 02/12/19 0606  •  nitroglycerin 50 mg/250 mL (0.2 mg/mL) infusion, 5-200 mcg/min, Intravenous, Titrated, Josué Hutch[place] DO, Stopped at 02/11/19 1116  •  potassium chloride (MICRO-K) CR capsule 40 mEq, 40 mEq, Oral, PRN, 40 mEq at 02/12/19 0826 **OR** potassium chloride (KLOR-CON) packet 40 mEq, 40 mEq, Oral, PRN **OR** potassium chloride 10 mEq in 100 mL IVPB, 10 mEq, Intravenous, Q1H PRN, Cathleen Zarate APRN  •  sodium chloride 0.9 % flush 10 mL, 10 mL, Intravenous, PRN, [place], Josué, DO    Medications Prior to Admission   Medication Sig Dispense Refill Last Dose   • aspirin 325 MG tablet Take 325 mg by mouth Daily.      • atorvastatin (LIPITOR) 20 MG tablet TAKE 1 TABLET BY MOUTH ONCE DAILY 30 tablet 3    • carvedilol (COREG) 12.5 MG tablet Take 12.5 mg by mouth 2 (Two) Times a Day With Meals.      • CloNIDine (CATAPRES) 0.1 MG tablet Take 1 tablet by mouth 3 (Three) Times a Day. 270 tablet 3    • isosorbide mononitrate (IMDUR) 60 MG 24 hr tablet TAKE 1 TABLET BY MOUTH ONCE DAILY 30 tablet 3    • NIFEdipine XL (PROCARDIA XL) 60 MG 24 hr tablet Take 60 mg by mouth Daily.     "      Review of Systems:    Constitutional-- No Fever/chills. + sweats. No significant change in weight  Eye-- no diplopia, no conjunctivitis  ENT-- No new hearing or throat complaints.  No epistaxis or oral sores. No odynophagia or dysphagia. No headache, photophobia or neck stiffness.  CV-- No chest pain, palpitations, soa. + edema  Resp-- + SOB/cough. NO Hemoptysis  GI- No nausea, vomiting, or diarrhea.  No hematochezia, melena, or hematemesis.  -- No dysuria, hematuria, or flank pain. No lower tract obstructive symptoms  Skin-- No rash, warm and dry  Lymph- no painful or swollen lymph nodes in neck/axilla or groin.   Heme- No active bruising or bleeding; no Hx of DVT or PE.  MS-- no swelling or pain in the joints  Neuro-- No acute focal weakness or numbness in the arms or legs.  No seizures.  Psych--No anxiety or depression  Endo--No cold or heat intolerance.  No polyuria, polydipsia, or polyphagia    Full review of systems reviewed and negative otherwise for acute complaints    Physical Exam:   Vital Signs   Blood pressure 133/72, pulse 58, temperature 97.7 °F (36.5 °C), temperature source Oral, resp. rate 16, height 177 cm (69.69\"), weight 93 kg (205 lb), SpO2 97 %.     GENERAL: Awake and alert, in no acute distress.   HEENT: Normocephalic, atraumatic.  PER.  No conjunctivitis. No icterus. Oropharynx clear without evidence of thrush or exudate. No evidence of peridontal disease.    NECK: Supple without nuchal rigidity. No mass.  LYMPH: No painful cervical, axillary or inguinal lymphadenopathy.  HEART: RRR; No murmur, rubs, gallops. No bruits in neck, abdomen, or groins. TR edema  LUNGS: Normal respiratory effort. Nonlabored. No dullness.  No crepitus.  Clear to auscultation bilaterally without wheezing, rales, rhonchi.  ABDOMEN: Soft, nontender, nondistended. Positive bowel sounds. No rebound or guarding. NO mass or HSM.  JOINTS:  Full range of motion, no redness or tenderness.  EXT:  No cyanosis, clubbing " or edema.  :  BLADDER NOT DISTENDED. NO CVAT.  SKIN: Warm and dry without rash  NEURO: Oriented to PPT. No focal neurological deficits. Strength equal bilateral  PSYCHIATRIC: Normal insight and judgement. Cooperative with PE    Laboratory Data  Results from last 7 days   Lab Units 02/11/19  0701   HEMOGLOBIN g/dL 13.2   HEMATOCRIT % 41.2     Results from last 7 days   Lab Units 02/12/19  0426 02/11/19  0701   SODIUM mmol/L 138 139   POTASSIUM mmol/L 3.4* 4.0   CHLORIDE mmol/L 103 106   CO2 mmol/L 29.0 28.0   BUN mg/dL 34* 32*   CREATININE mg/dL 1.96* 1.72*   CALCIUM mg/dL 8.8 8.8   MAGNESIUM mg/dL 1.7  --    ALBUMIN g/dL  --  3.90     Results from last 7 days   Lab Units 02/12/19  0426   GLUCOSE mg/dL 97         Results from last 7 days   Lab Units 02/11/19  0701   ALK PHOS U/L 81   BILIRUBIN mg/dL 0.5   ALT (SGPT) U/L 14   AST (SGOT) U/L 18     Estimated Creatinine Clearance: 43.4 mL/min (A) (by C-G formula based on SCr of 1.96 mg/dL (H)).    Radiology: RENAL DOPPLER PENDING.      Impression: DOMO LIKELY FROM IMPROVEMENT IN BP. CKD ? ETIOLOGY. ? NEPHROSCLEROSIS. SEVERE HTN IS BETTER.       PLAN: Thank you for asking us to see Rony He, I recommend the following: AWAIT DOPPLER, RENIN, ZHANG AND METANEPHRINES. CHECK RENAL U/S, URINE STUDIES AND CPK. WILL FOLLOW AS INPATIENT AND AS OUTPATIENT AFTER DISCHARGE.        James Starr MD  2/12/2019  12:14 PM

## 2019-02-13 ENCOUNTER — APPOINTMENT (OUTPATIENT)
Dept: CARDIOLOGY | Facility: HOSPITAL | Age: 64
End: 2019-02-13

## 2019-02-13 LAB
ALBUMIN SERPL-MCNC: 3.88 G/DL (ref 3.2–4.8)
ANION GAP SERPL CALCULATED.3IONS-SCNC: 7 MMOL/L (ref 3–11)
BH CV STRESS BP STAGE 1: NORMAL
BH CV STRESS BP STAGE 3: NORMAL
BH CV STRESS COMMENTS STAGE 1: NORMAL
BH CV STRESS DOSE REGADENOSON STAGE 1: 0.4
BH CV STRESS DURATION MIN STAGE 1: 1
BH CV STRESS DURATION MIN STAGE 2: 1
BH CV STRESS DURATION MIN STAGE 3: 1
BH CV STRESS DURATION MIN STAGE 4: 1
BH CV STRESS DURATION SEC STAGE 1: 10
BH CV STRESS DURATION SEC STAGE 2: 0
BH CV STRESS HR STAGE 1: 70
BH CV STRESS HR STAGE 2: 72
BH CV STRESS HR STAGE 3: 71
BH CV STRESS HR STAGE 4: 73
BH CV STRESS PROTOCOL 1: NORMAL
BH CV STRESS RECOVERY BP: NORMAL MMHG
BH CV STRESS RECOVERY HR: 68 BPM
BH CV STRESS STAGE 1: 1
BH CV STRESS STAGE 2: 2
BH CV STRESS STAGE 3: 3
BH CV STRESS STAGE 4: 4
BUN BLD-MCNC: 47 MG/DL (ref 9–23)
BUN/CREAT SERPL: 18.6 (ref 7–25)
CALCIUM SPEC-SCNC: 9.1 MG/DL (ref 8.7–10.4)
CHLORIDE SERPL-SCNC: 102 MMOL/L (ref 99–109)
CK SERPL-CCNC: 224 U/L (ref 26–174)
CO2 SERPL-SCNC: 29 MMOL/L (ref 20–31)
CREAT BLD-MCNC: 2.53 MG/DL (ref 0.6–1.3)
GFR SERPL CREATININE-BSD FRML MDRD: 26 ML/MIN/1.73
GLUCOSE BLD-MCNC: 108 MG/DL (ref 70–100)
MAXIMAL PREDICTED HEART RATE: 156 BPM
PERCENT MAX PREDICTED HR: 46.79 %
PHOSPHATE SERPL-MCNC: 5.3 MG/DL (ref 2.4–5.1)
POTASSIUM BLD-SCNC: 3.5 MMOL/L (ref 3.5–5.5)
SODIUM BLD-SCNC: 138 MMOL/L (ref 132–146)
STRESS BASELINE BP: NORMAL MMHG
STRESS BASELINE HR: 59 BPM
STRESS PERCENT HR: 55 %
STRESS POST PEAK BP: NORMAL MMHG
STRESS POST PEAK HR: 73 BPM
STRESS TARGET HR: 133 BPM

## 2019-02-13 PROCEDURE — 93017 CV STRESS TEST TRACING ONLY: CPT

## 2019-02-13 PROCEDURE — 0 RUBIDIUM CHLORIDE: Performed by: NURSE PRACTITIONER

## 2019-02-13 PROCEDURE — 25010000002 REGADENOSON 0.4 MG/5ML SOLUTION: Performed by: NURSE PRACTITIONER

## 2019-02-13 PROCEDURE — 82550 ASSAY OF CK (CPK): CPT | Performed by: INTERNAL MEDICINE

## 2019-02-13 PROCEDURE — 99232 SBSQ HOSP IP/OBS MODERATE 35: CPT | Performed by: PHYSICIAN ASSISTANT

## 2019-02-13 PROCEDURE — A9555 RB82 RUBIDIUM: HCPCS | Performed by: NURSE PRACTITIONER

## 2019-02-13 PROCEDURE — 94799 UNLISTED PULMONARY SVC/PX: CPT

## 2019-02-13 PROCEDURE — 78492 MYOCRD IMG PET MLT RST&STRS: CPT | Performed by: INTERNAL MEDICINE

## 2019-02-13 PROCEDURE — 93018 CV STRESS TEST I&R ONLY: CPT | Performed by: INTERNAL MEDICINE

## 2019-02-13 PROCEDURE — 25010000002 HEPARIN (PORCINE) PER 1000 UNITS: Performed by: NURSE PRACTITIONER

## 2019-02-13 PROCEDURE — 80069 RENAL FUNCTION PANEL: CPT | Performed by: INTERNAL MEDICINE

## 2019-02-13 PROCEDURE — 25010000002 FUROSEMIDE PER 20 MG: Performed by: NURSE PRACTITIONER

## 2019-02-13 PROCEDURE — 78492 MYOCRD IMG PET MLT RST&STRS: CPT

## 2019-02-13 RX ADMIN — IPRATROPIUM BROMIDE AND ALBUTEROL SULFATE 3 ML: 2.5; .5 SOLUTION RESPIRATORY (INHALATION) at 15:57

## 2019-02-13 RX ADMIN — CLONIDINE HYDROCHLORIDE 0.2 MG: 0.1 TABLET ORAL at 14:26

## 2019-02-13 RX ADMIN — NICARDIPINE HYDROCHLORIDE 5 MG/HR: 0.1 INJECTION, SOLUTION INTRAVENOUS at 05:35

## 2019-02-13 RX ADMIN — NICARDIPINE HYDROCHLORIDE 5 MG/HR: 0.1 INJECTION, SOLUTION INTRAVENOUS at 00:35

## 2019-02-13 RX ADMIN — IPRATROPIUM BROMIDE AND ALBUTEROL SULFATE 3 ML: 2.5; .5 SOLUTION RESPIRATORY (INHALATION) at 11:03

## 2019-02-13 RX ADMIN — REGADENOSON 0.4 MG: 0.08 INJECTION, SOLUTION INTRAVENOUS at 08:49

## 2019-02-13 RX ADMIN — HEPARIN SODIUM 5000 UNITS: 5000 INJECTION INTRAVENOUS; SUBCUTANEOUS at 05:33

## 2019-02-13 RX ADMIN — HEPARIN SODIUM 5000 UNITS: 5000 INJECTION INTRAVENOUS; SUBCUTANEOUS at 21:23

## 2019-02-13 RX ADMIN — RUBIDIUM CHLORIDE RB-82 1 DOSE: 150 INJECTION, SOLUTION INTRAVENOUS at 08:47

## 2019-02-13 RX ADMIN — CARVEDILOL 25 MG: 12.5 TABLET, FILM COATED ORAL at 09:31

## 2019-02-13 RX ADMIN — CLONIDINE HYDROCHLORIDE 0.2 MG: 0.1 TABLET ORAL at 21:25

## 2019-02-13 RX ADMIN — FUROSEMIDE 40 MG: 10 INJECTION, SOLUTION INTRAMUSCULAR; INTRAVENOUS at 00:07

## 2019-02-13 RX ADMIN — NITROGLYCERIN 2 INCH: 20 OINTMENT TOPICAL at 18:29

## 2019-02-13 RX ADMIN — RUBIDIUM CHLORIDE RB-82 1 DOSE: 150 INJECTION, SOLUTION INTRAVENOUS at 08:35

## 2019-02-13 RX ADMIN — IPRATROPIUM BROMIDE AND ALBUTEROL SULFATE 3 ML: 2.5; .5 SOLUTION RESPIRATORY (INHALATION) at 19:31

## 2019-02-13 RX ADMIN — IPRATROPIUM BROMIDE AND ALBUTEROL SULFATE 3 ML: 2.5; .5 SOLUTION RESPIRATORY (INHALATION) at 07:39

## 2019-02-13 RX ADMIN — NITROGLYCERIN 2 INCH: 20 OINTMENT TOPICAL at 12:20

## 2019-02-13 RX ADMIN — CLONIDINE HYDROCHLORIDE 0.2 MG: 0.1 TABLET ORAL at 05:32

## 2019-02-13 RX ADMIN — CARVEDILOL 25 MG: 12.5 TABLET, FILM COATED ORAL at 21:23

## 2019-02-13 RX ADMIN — NIFEDIPINE 90 MG: 60 TABLET, FILM COATED, EXTENDED RELEASE ORAL at 09:31

## 2019-02-13 RX ADMIN — FUROSEMIDE 40 MG: 10 INJECTION, SOLUTION INTRAMUSCULAR; INTRAVENOUS at 12:20

## 2019-02-13 RX ADMIN — HEPARIN SODIUM 5000 UNITS: 5000 INJECTION INTRAVENOUS; SUBCUTANEOUS at 14:26

## 2019-02-13 RX ADMIN — ASPIRIN 81 MG 81 MG: 81 TABLET ORAL at 09:30

## 2019-02-13 RX ADMIN — ATORVASTATIN CALCIUM 20 MG: 20 TABLET, FILM COATED ORAL at 09:31

## 2019-02-13 NOTE — PROGRESS NOTES
"   LOS: 2 days    Patient Care Team:  Provider, No Known as PCP - General    Reason For Visit:  DOMO ON CKD  Subjective           Review of Systems:    Pulm: No soa   CV:  No CP      Objective       aspirin 81 mg Oral Daily   atorvastatin 20 mg Oral Daily   carvedilol 25 mg Oral Q12H   CloNIDine 0.2 mg Oral Q8H   furosemide 40 mg Intravenous Q12H   heparin (porcine) 5,000 Units Subcutaneous Q8H   ipratropium-albuterol 3 mL Nebulization 4x Daily - RT   NIFEdipine XL 90 mg Oral Q24H   nitroglycerin 2 inch Topical Q6H       niCARdipine 5-15 mg/hr Last Rate: 5 mg/hr (02/13/19 0535)   nitroglycerin 5-200 mcg/min Last Rate: Stopped (02/11/19 1116)         Vital Signs:  Blood pressure 146/82, pulse 69, temperature 98.1 °F (36.7 °C), temperature source Oral, resp. rate 20, height 177 cm (69.69\"), weight 93 kg (205 lb), SpO2 96 %.    Flowsheet Rows      First Filed Value   Admission Height  177.8 cm (70\") Documented at 02/11/2019 0642   Admission Weight  93 kg (205 lb) Documented at 02/11/2019 0642          02/12 0701 - 02/13 0700  In: -   Out: 2950 [Urine:2950]    Physical Exam:    General Appearance: NAD, alert and cooperative, Ox3  Eyes: PER, conjunctivae and sclerae normal, no icterus  Lungs: respirations regular and unlabored, no crepitus, clear to auscultation  Heart/CV: regular rhythm & normal rate, no murmur, no gallop, no rub and no edema  Abdomen: not distended, soft, non-tender, no masses,  bowel sounds present  Skin: No rash, Warm and dry    Radiology: RENAL U/S: L KIDNEY 9.2 CM. R KIDNEY 11.3 CM. DOPPLER ? L MICHAEL.            Labs: . P/C RATIO 1401. U EOS NEG.  RENIN/ZHANG/METANEPHRINES PENDING.  Results from last 7 days   Lab Units 02/11/19  0701   WBC 10*3/mm3 11.88*   HEMOGLOBIN g/dL 13.2   HEMATOCRIT % 41.2   PLATELETS 10*3/mm3 256     Results from last 7 days   Lab Units 02/13/19  0434 02/12/19  1602 02/12/19  0426 02/11/19  0701   SODIUM mmol/L 138  --  138 139   POTASSIUM mmol/L 3.5 4.1 3.4* 4.0 "   CHLORIDE mmol/L 102  --  103 106   CO2 mmol/L 29.0  --  29.0 28.0   BUN mg/dL 47*  --  34* 32*   CREATININE mg/dL 2.53*  --  1.96* 1.72*   CALCIUM mg/dL 9.1  --  8.8 8.8   PHOSPHORUS mg/dL 5.3*  --   --   --    MAGNESIUM mg/dL  --   --  1.7  --    ALBUMIN g/dL 3.88  --   --  3.90     Results from last 7 days   Lab Units 02/13/19  0434   GLUCOSE mg/dL 108*       Results from last 7 days   Lab Units 02/11/19  0701   ALK PHOS U/L 81   BILIRUBIN mg/dL 0.5   ALT (SGPT) U/L 14   AST (SGOT) U/L 18                 Estimated Creatinine Clearance: 33.6 mL/min (A) (by C-G formula based on SCr of 2.53 mg/dL (H)).      Assessment       Malignant hypertension    Dyslipidemia    Chronic systolic congestive heart failure (CMS/HCC)    Tobacco use    Chest pain            Impression: DOMO ON CKD. WORSE GFR LIKELY FROM MALIGNANT HTN.  ? L MICHAEL. PROTEINURIA. HTN WITH BP BETTER.            Recommendations: PRESENT CARE. WOULD NOT DO RENAL ANGIOGRAM AT PRESENT. CHECK NEVA/ANCA. WOULD LIKE TO SEE RENAL FXN IMPROVE PRIOR TO DISCHARGE.      James Starr MD  02/13/19  12:55 PM

## 2019-02-13 NOTE — PROGRESS NOTES
"[facility] at [facility] Progress Note     LOS: 2 days   Patient Care Team:  Provider, No Known as PCP - General  PCP:  Provider, No Known    Chief Complaint:  Chf/elevated troponin    SUBJECTIVE:   Pt in chair, receiving neb treatment. Reports he's feeling much better. BP is controlled. Expresses frustration that he hasn't been taken for his stress yet. Denies CP, palps, dyspnea above baseline      Review of Systems:   All systems have been reviewed and are negative with the exception of those mentioned above.      OBJECTIVE:    Vital Sign Min/Max for last 24 hours  Temp  Min: 97.7 °F (36.5 °C)  Max: 98.1 °F (36.7 °C)   BP  Min: 116/60  Max: 152/91   Pulse  Min: 51  Max: 70   Resp  Min: 16  Max: 18   SpO2  Min: 89 %  Max: 97 %   Flow (L/min)  Min: 2  Max: 3   No Data Recorded     Flowsheet Rows      First Filed Value   Admission Height  177.8 cm (70\") Documented at 02/11/2019 0642   Admission Weight  93 kg (205 lb) Documented at 02/11/2019 0642          Telemetry: sr      Intake/Output Summary (Last 24 hours) at 2/13/2019 0738  Last data filed at 2/13/2019 0600  Gross per 24 hour   Intake --   Output 2950 ml   Net -2950 ml     Intake & Output (last 3 days)       02/10 0701 - 02/11 0700 02/11 0701 - 02/12 0700 02/12 0701 - 02/13 0700 02/13 0701 - 02/14 0700    Urine (mL/kg/hr)  4165 (1.9) 2950 (1.3)     Total Output  4165 2950     Net  -4165 -2950                    Physical Exam:  Physical Exam   Constitutional: He is oriented to person, place, and time. He appears well-developed and well-nourished. No distress.   Cardiovascular: Normal rate, regular rhythm and intact distal pulses.   Murmur heard.  Pulses:       Radial pulses are 2+ on the right side, and 2+ on the left side.        Dorsalis pedis pulses are 2+ on the right side, and 2+ on the left side.        Posterior tibial pulses are 2+ on the right side, and 2+ on the left side.   Pulmonary/Chest: Effort normal and breath sounds " normal. He has no wheezes. He has no rales.   Abdominal: Soft. Bowel sounds are normal. There is no tenderness. There is no guarding.   Musculoskeletal: He exhibits no edema or tenderness.   Neurological: He is alert and oriented to person, place, and time.   Skin: Skin is warm and dry. No rash noted.   Psychiatric: He has a normal mood and affect.   Nursing note and vitals reviewed.       LABS/DIAGNOSTIC DATA:  Results from last 7 days   Lab Units 02/11/19  0701   WBC 10*3/mm3 11.88*   HEMOGLOBIN g/dL 13.2   HEMATOCRIT % 41.2   PLATELETS 10*3/mm3 256     No results found for: TROPONINT      Results from last 7 days   Lab Units 02/13/19  0434 02/12/19  1602 02/12/19  0426 02/11/19  0701   SODIUM mmol/L 138  --  138 139   POTASSIUM mmol/L 3.5 4.1 3.4* 4.0   CHLORIDE mmol/L 102  --  103 106   CO2 mmol/L 29.0  --  29.0 28.0   BUN mg/dL 47*  --  34* 32*   CREATININE mg/dL 2.53*  --  1.96* 1.72*   CALCIUM mg/dL 9.1  --  8.8 8.8   BILIRUBIN mg/dL  --   --   --  0.5   ALK PHOS U/L  --   --   --  81   ALT (SGPT) U/L  --   --   --  14   AST (SGOT) U/L  --   --   --  18   GLUCOSE mg/dL 108*  --  97 120*     Results from last 7 days   Lab Units 02/11/19  1128   HEMOGLOBIN A1C % 5.70*     Results from last 7 days   Lab Units 02/12/19  0426   CHOLESTEROL mg/dL 163   TRIGLYCERIDES mg/dL 186*   HDL CHOL mg/dL 28*   LDL CHOL mg/dL 117         Results from last 7 days   Lab Units 02/11/19  0701   BNP pg/mL 1,581.0*       Medication Review:     aspirin 81 mg Oral Daily   atorvastatin 20 mg Oral Daily   carvedilol 25 mg Oral Q12H   CloNIDine 0.2 mg Oral Q8H   furosemide 40 mg Intravenous Q12H   heparin (porcine) 5,000 Units Subcutaneous Q8H   ipratropium-albuterol 3 mL Nebulization 4x Daily - RT   NIFEdipine XL 90 mg Oral Q24H   nitroglycerin 2 inch Topical Q6H        niCARdipine 5-15 mg/hr Last Rate: 5 mg/hr (02/13/19 1904)   nitroglycerin 5-200 mcg/min Last Rate: Stopped (02/11/19 1266)          Malignant hypertension     Dyslipidemia    Chronic systolic congestive heart failure (CMS/[facility])    Tobacco use    Chest pain      ASSESSMENT/PLAN:  HTN  - uncontrolled, 269/130 at presentation.   - htn labs ordered, not available for review as of yet  - Improved w cardene  - continue coreg, clonidine, nifedipine  - cardene can be weaned. will adjust Po meds as necessary     Elevated troponin/CAD  - troponin up to 0.5  - LExiPET today. Pt would be high risk for renal injury if cath is needed d/t worsening renal status  - continue ASA, statin, BB    Chronic Diastolic CHF  - echo 2/11 EF 55%  - BNP >1500 upon presentation   - continue diuresis     A/CKD  - NAL following    HLD  - statin    Tobacco Abuse  - cessation counseling        Electronically signed by ERVIN Abel, 02/13/19, 7:28 AM.

## 2019-02-13 NOTE — PROGRESS NOTES
"[facility] at [facility] Progress Note     LOS: 2 days   Patient Care Team:  Provider, No Known as PCP - General  PCP:  Provider, No Known    Chief Complaint:  Fu chf/htn    SUBJECTIVE: breathing better  bp much improved      Review of Systems:   All systems have been reviewed and are negative with the exception of those mentioned above.      OBJECTIVE:    Vital Sign Min/Max for last 24 hours  Temp  Min: 98.1 °F (36.7 °C)  Max: 98.5 °F (36.9 °C)   BP  Min: 116/60  Max: 148/73   Pulse  Min: 51  Max: 69   Resp  Min: 16  Max: 20   SpO2  Min: 89 %  Max: 97 %   No Data Recorded   No Data Recorded     Flowsheet Rows      First Filed Value   Admission Height  177.8 cm (70\") Documented at 02/11/2019 0642   Admission Weight  93 kg (205 lb) Documented at 02/11/2019 0642          Telemetry: sr      Intake/Output Summary (Last 24 hours) at 2/13/2019 1725  Last data filed at 2/13/2019 0800  Gross per 24 hour   Intake 0 ml   Output 2075 ml   Net -2075 ml     Intake & Output (last 3 days)       02/10 0701 - 02/11 0700 02/11 0701 - 02/12 0700 02/12 0701 - 02/13 0700 02/13 0701 - 02/14 0700    P.O.    0    Total Intake(mL/kg)    0 (0)    Urine (mL/kg/hr)  4165 (1.9) 2950 (1.3) 275 (0.3)    Stool    0    Total Output  4165 2950 275    Net  -4165 -2950 -275            Stool Unmeasured Occurrence    1 x           Physical Exam:  Physical Exam   Constitutional: He appears well-developed and well-nourished.   Neck: Normal range of motion. Neck supple. No hepatojugular reflux and no JVD present. Carotid bruit is not present. No tracheal deviation present. No thyromegaly present.   Cardiovascular: Normal rate, regular rhythm, S1 normal, S2 normal, intact distal pulses and normal pulses. PMI is not displaced. Exam reveals no gallop, no distant heart sounds, no friction rub, no midsystolic click and no opening snap.   No murmur heard.  Pulses:       Radial pulses are 2+ on the right side, and 2+ on the left side.     "    Dorsalis pedis pulses are 2+ on the right side, and 2+ on the left side.        Posterior tibial pulses are 2+ on the right side, and 2+ on the left side.   Pulmonary/Chest: Effort normal and breath sounds normal. He has no wheezes. He has no rales.   Abdominal: Soft. Bowel sounds are normal. He exhibits no mass. There is no tenderness. There is no guarding.        LABS/DIAGNOSTIC DATA:  Results from last 7 days   Lab Units 02/11/19  0701   WBC 10*3/mm3 11.88*   HEMOGLOBIN g/dL 13.2   HEMATOCRIT % 41.2   PLATELETS 10*3/mm3 256     No results found for: TROPONINT      Results from last 7 days   Lab Units 02/13/19  0434 02/12/19  1602 02/12/19  0426 02/11/19  0701   SODIUM mmol/L 138  --  138 139   POTASSIUM mmol/L 3.5 4.1 3.4* 4.0   CHLORIDE mmol/L 102  --  103 106   CO2 mmol/L 29.0  --  29.0 28.0   BUN mg/dL 47*  --  34* 32*   CREATININE mg/dL 2.53*  --  1.96* 1.72*   CALCIUM mg/dL 9.1  --  8.8 8.8   BILIRUBIN mg/dL  --   --   --  0.5   ALK PHOS U/L  --   --   --  81   ALT (SGPT) U/L  --   --   --  14   AST (SGOT) U/L  --   --   --  18   GLUCOSE mg/dL 108*  --  97 120*     Results from last 7 days   Lab Units 02/11/19  1128   HEMOGLOBIN A1C % 5.70*     Results from last 7 days   Lab Units 02/12/19  0426   CHOLESTEROL mg/dL 163   TRIGLYCERIDES mg/dL 186*   HDL CHOL mg/dL 28*   LDL CHOL mg/dL 117         Results from last 7 days   Lab Units 02/11/19  0701   BNP pg/mL 1,581.0*       Medication Review:     aspirin 81 mg Oral Daily   atorvastatin 20 mg Oral Daily   carvedilol 25 mg Oral Q12H   CloNIDine 0.2 mg Oral Q8H   furosemide 40 mg Intravenous Q12H   heparin (porcine) 5,000 Units Subcutaneous Q8H   ipratropium-albuterol 3 mL Nebulization 4x Daily - RT   NIFEdipine XL 90 mg Oral Q24H   nitroglycerin 2 inch Topical Q6H        niCARdipine 5-15 mg/hr Last Rate: 5 mg/hr (02/13/19 1074)   nitroglycerin 5-200 mcg/min Last Rate: Stopped (02/11/19 5716)          Malignant hypertension    Dyslipidemia    Chronic systolic  congestive heart failure (CMS/HCC)    Tobacco use    Chest pain      ASSESSMENT/PLAN:  HTN accelerated -improved post diuresis    CKD w transient increase in s creat. Dc lasix  Fu labs in am if stabilizing home    CHF/CAD stress w fixed inferior defect and LV dysfxn- not significantly different from MPS in [place] 2017  Would revisit [facility] as outpt once bp and gfr stable.            Job Wood MD   02/13/19  5:25 PM

## 2019-02-14 ENCOUNTER — TELEPHONE (OUTPATIENT)
Dept: CARDIOLOGY | Facility: CLINIC | Age: 64
End: 2019-02-14

## 2019-02-14 VITALS
HEIGHT: 70 IN | BODY MASS INDEX: 29.35 KG/M2 | SYSTOLIC BLOOD PRESSURE: 144 MMHG | HEART RATE: 64 BPM | OXYGEN SATURATION: 96 % | WEIGHT: 205 LBS | TEMPERATURE: 97.9 F | DIASTOLIC BLOOD PRESSURE: 99 MMHG | RESPIRATION RATE: 16 BRPM

## 2019-02-14 LAB
ALBUMIN SERPL-MCNC: 3.83 G/DL (ref 3.2–4.8)
ANION GAP SERPL CALCULATED.3IONS-SCNC: 7 MMOL/L (ref 3–11)
BUN BLD-MCNC: 51 MG/DL (ref 9–23)
BUN/CREAT SERPL: 21.3 (ref 7–25)
CALCIUM SPEC-SCNC: 8.9 MG/DL (ref 8.7–10.4)
CHLORIDE SERPL-SCNC: 100 MMOL/L (ref 99–109)
CO2 SERPL-SCNC: 29 MMOL/L (ref 20–31)
CREAT BLD-MCNC: 2.39 MG/DL (ref 0.6–1.3)
GFR SERPL CREATININE-BSD FRML MDRD: 28 ML/MIN/1.73
GLUCOSE BLD-MCNC: 101 MG/DL (ref 70–100)
PHOSPHATE SERPL-MCNC: 6 MG/DL (ref 2.4–5.1)
POTASSIUM BLD-SCNC: 3.4 MMOL/L (ref 3.5–5.5)
RENIN PLAS-CCNC: 0.78 NG/ML/HR (ref 0.17–5.38)
SODIUM BLD-SCNC: 136 MMOL/L (ref 132–146)

## 2019-02-14 PROCEDURE — 86256 FLUORESCENT ANTIBODY TITER: CPT | Performed by: INTERNAL MEDICINE

## 2019-02-14 PROCEDURE — 86038 ANTINUCLEAR ANTIBODIES: CPT | Performed by: INTERNAL MEDICINE

## 2019-02-14 PROCEDURE — 25010000002 HEPARIN (PORCINE) PER 1000 UNITS: Performed by: NURSE PRACTITIONER

## 2019-02-14 PROCEDURE — 83520 IMMUNOASSAY QUANT NOS NONAB: CPT | Performed by: INTERNAL MEDICINE

## 2019-02-14 PROCEDURE — 94799 UNLISTED PULMONARY SVC/PX: CPT

## 2019-02-14 PROCEDURE — 99232 SBSQ HOSP IP/OBS MODERATE 35: CPT | Performed by: PHYSICIAN ASSISTANT

## 2019-02-14 PROCEDURE — 80069 RENAL FUNCTION PANEL: CPT | Performed by: INTERNAL MEDICINE

## 2019-02-14 RX ORDER — TORSEMIDE 10 MG/1
5 TABLET ORAL DAILY PRN
Status: DISCONTINUED | OUTPATIENT
Start: 2019-02-14 | End: 2019-02-14 | Stop reason: HOSPADM

## 2019-02-14 RX ORDER — CARVEDILOL 25 MG/1
25 TABLET ORAL EVERY 12 HOURS SCHEDULED
Qty: 180 TABLET | Refills: 3 | Status: SHIPPED | OUTPATIENT
Start: 2019-02-14 | End: 2020-11-10

## 2019-02-14 RX ORDER — ASPIRIN 81 MG/1
81 TABLET, CHEWABLE ORAL DAILY
Qty: 90 TABLET | Refills: 3 | Status: SHIPPED | OUTPATIENT
Start: 2019-02-14

## 2019-02-14 RX ORDER — TORSEMIDE 5 MG/1
5 TABLET ORAL DAILY PRN
Qty: 30 TABLET | Refills: 3 | Status: SHIPPED | OUTPATIENT
Start: 2019-02-14 | End: 2020-11-10

## 2019-02-14 RX ORDER — CLONIDINE HYDROCHLORIDE 0.2 MG/1
0.2 TABLET ORAL EVERY 8 HOURS SCHEDULED
Qty: 180 TABLET | Refills: 3 | Status: SHIPPED | OUTPATIENT
Start: 2019-02-14 | End: 2020-11-10

## 2019-02-14 RX ADMIN — NIFEDIPINE 90 MG: 60 TABLET, FILM COATED, EXTENDED RELEASE ORAL at 08:29

## 2019-02-14 RX ADMIN — POTASSIUM CHLORIDE 40 MEQ: 750 CAPSULE, EXTENDED RELEASE ORAL at 08:29

## 2019-02-14 RX ADMIN — HEPARIN SODIUM 5000 UNITS: 5000 INJECTION INTRAVENOUS; SUBCUTANEOUS at 06:00

## 2019-02-14 RX ADMIN — NITROGLYCERIN 2 INCH: 20 OINTMENT TOPICAL at 06:00

## 2019-02-14 RX ADMIN — CARVEDILOL 25 MG: 12.5 TABLET, FILM COATED ORAL at 08:29

## 2019-02-14 RX ADMIN — CLONIDINE HYDROCHLORIDE 0.2 MG: 0.1 TABLET ORAL at 06:00

## 2019-02-14 RX ADMIN — IPRATROPIUM BROMIDE AND ALBUTEROL SULFATE 3 ML: 2.5; .5 SOLUTION RESPIRATORY (INHALATION) at 07:50

## 2019-02-14 RX ADMIN — ATORVASTATIN CALCIUM 20 MG: 20 TABLET, FILM COATED ORAL at 08:29

## 2019-02-14 RX ADMIN — ASPIRIN 81 MG 81 MG: 81 TABLET ORAL at 08:29

## 2019-02-14 NOTE — PROGRESS NOTES
"[facility] at [facility] Progress Note     LOS: 3 days   Patient Care Team:  Provider, No Known as PCP - General  PCP:  Provider, No Known    Chief Complaint:  Chf/elevated troponin    SUBJECTIVE:   Pt is feeling better. Dyspnea at baseline, no CP, palps.      Review of Systems:   All systems have been reviewed and are negative with the exception of those mentioned above.      OBJECTIVE:    Vital Sign Min/Max for last 24 hours  Temp  Min: 97.8 °F (36.6 °C)  Max: 98.5 °F (36.9 °C)   BP  Min: 131/82  Max: 170/98   Pulse  Min: 59  Max: 76   Resp  Min: 16  Max: 20   SpO2  Min: 96 %  Max: 96 %   No Data Recorded   No Data Recorded     Flowsheet Rows      First Filed Value   Admission Height  177.8 cm (70\") Documented at 02/11/2019 0642   Admission Weight  93 kg (205 lb) Documented at 02/11/2019 0642          Telemetry:off tele      Intake/Output Summary (Last 24 hours) at 2/14/2019 0730  Last data filed at 2/13/2019 1700  Gross per 24 hour   Intake 925 ml   Output 275 ml   Net 650 ml     Intake & Output (last 3 days)       02/11 0701 - 02/12 0700 02/12 0701 - 02/13 0700 02/13 0701 - 02/14 0700 02/14 0701 - 02/15 0700    P.O.   925     Total Intake(mL/kg)   925 (9.9)     Urine (mL/kg/hr) 4165 (1.9) 2950 (1.3) 275 (0.1)     Stool   0     Total Output 4165 2950 275     Net -4165 -2950 +650             Stool Unmeasured Occurrence   1 x            Physical Exam:  Physical Exam   Constitutional: He is oriented to person, place, and time. He appears well-developed and well-nourished. No distress.   Cardiovascular: Normal rate, regular rhythm and intact distal pulses.   Murmur heard.  Pulses:       Radial pulses are 2+ on the right side, and 2+ on the left side.        Dorsalis pedis pulses are 2+ on the right side, and 2+ on the left side.        Posterior tibial pulses are 2+ on the right side, and 2+ on the left side.   Pulmonary/Chest: Effort normal and breath sounds normal. He has no wheezes. He has " no rales.   Abdominal: Soft. Bowel sounds are normal. There is no tenderness. There is no guarding.   Musculoskeletal: He exhibits no edema or tenderness.   Neurological: He is alert and oriented to person, place, and time.   Skin: Skin is warm and dry. No rash noted.   Psychiatric: He has a normal mood and affect.   Nursing note and vitals reviewed.       LABS/DIAGNOSTIC DATA:  Results from last 7 days   Lab Units 02/11/19  0701   WBC 10*3/mm3 11.88*   HEMOGLOBIN g/dL 13.2   HEMATOCRIT % 41.2   PLATELETS 10*3/mm3 256     No results found for: TROPONINT      Results from last 7 days   Lab Units 02/14/19  0420 02/13/19  0434 02/12/19  1602 02/12/19  0426 02/11/19  0701   SODIUM mmol/L 136 138  --  138 139   POTASSIUM mmol/L 3.4* 3.5 4.1 3.4* 4.0   CHLORIDE mmol/L 100 102  --  103 106   CO2 mmol/L 29.0 29.0  --  29.0 28.0   BUN mg/dL 51* 47*  --  34* 32*   CREATININE mg/dL 2.39* 2.53*  --  1.96* 1.72*   CALCIUM mg/dL 8.9 9.1  --  8.8 8.8   BILIRUBIN mg/dL  --   --   --   --  0.5   ALK PHOS U/L  --   --   --   --  81   ALT (SGPT) U/L  --   --   --   --  14   AST (SGOT) U/L  --   --   --   --  18   GLUCOSE mg/dL 101* 108*  --  97 120*     Results from last 7 days   Lab Units 02/11/19  1128   HEMOGLOBIN A1C % 5.70*     Results from last 7 days   Lab Units 02/12/19  0426   CHOLESTEROL mg/dL 163   TRIGLYCERIDES mg/dL 186*   HDL CHOL mg/dL 28*   LDL CHOL mg/dL 117         Results from last 7 days   Lab Units 02/11/19  0701   BNP pg/mL 1,581.0*       Medication Review:     aspirin 81 mg Oral Daily   atorvastatin 20 mg Oral Daily   carvedilol 25 mg Oral Q12H   CloNIDine 0.2 mg Oral Q8H   heparin (porcine) 5,000 Units Subcutaneous Q8H   ipratropium-albuterol 3 mL Nebulization 4x Daily - RT   NIFEdipine XL 90 mg Oral Q24H   nitroglycerin 2 inch Topical Q6H        niCARdipine 5-15 mg/hr Last Rate: Stopped (02/13/19 0830)   nitroglycerin 5-200 mcg/min Last Rate: Stopped (02/11/19 1116)          Malignant hypertension     Dyslipidemia    Chronic systolic congestive heart failure (CMS/[facility])    Tobacco use    Chest pain      ASSESSMENT/PLAN:  HTN  - uncontrolled, 269/130 at presentation. Improved post diuresis.  - continue increased doses of coreg, clonidine, nifedipine    Elevated troponin/CAD  - troponin up to 0.5  - LExiPET revealed moderate fixed inferior defect w decreased EF, similar to MPS done in [place] in 2017. Plan to revisit [facility] outpt once renal function and BP are stable  - continue ASA, statin, BB    Chronic Diastolic CHF  - echo 2/11 EF 55%  - BNP >1500 upon presentation   - continue diuresis     A/CKD  - NAL following     HLD  - statin    Tobacco Abuse  - cessation counseling    Renal function now improving. D/u w BMP next week. BP much better. Home when ok per nephrology. Pt to f/u w Dr. Wood in 4 weeks.     Electronically signed by Shirlene Christian PA-C, 02/14/19, 7:30 AM.

## 2019-02-14 NOTE — PROGRESS NOTES
"   LOS: 3 days    Patient Care Team:  Provider, No Known as PCP - General    Reason For Visit:  F/U DOMO ON CKD  Subjective           Review of Systems:    Pulm: No soa   CV:  No CP      Objective       aspirin 81 mg Oral Daily   atorvastatin 20 mg Oral Daily   carvedilol 25 mg Oral Q12H   CloNIDine 0.2 mg Oral Q8H   heparin (porcine) 5,000 Units Subcutaneous Q8H   ipratropium-albuterol 3 mL Nebulization 4x Daily - RT   NIFEdipine XL 90 mg Oral Q24H   nitroglycerin 2 inch Topical Q6H       niCARdipine 5-15 mg/hr Last Rate: Stopped (02/13/19 0830)   nitroglycerin 5-200 mcg/min Last Rate: Stopped (02/11/19 1116)         Vital Signs:  Blood pressure 144/99, pulse 64, temperature 97.9 °F (36.6 °C), temperature source Oral, resp. rate 16, height 177 cm (69.69\"), weight 93 kg (205 lb), SpO2 96 %.    Flowsheet Rows      First Filed Value   Admission Height  177.8 cm (70\") Documented at 02/11/2019 0642   Admission Weight  93 kg (205 lb) Documented at 02/11/2019 0642          02/13 0701 - 02/14 0700  In: 925 [P.O.:925]  Out: 275 [Urine:275]    Physical Exam:    General Appearance: NAD, alert and cooperative, Ox3  Eyes: PER, conjunctivae and sclerae normal, no icterus  Lungs: respirations regular and unlabored, no crepitus, clear to auscultation  Heart/CV: regular rhythm & normal rate, no murmur, no gallop, no rub and no edema  Abdomen: not distended, soft, non-tender, no masses,  bowel sounds present  Skin: No rash, Warm and dry    Radiology:            Labs:  Results from last 7 days   Lab Units 02/11/19  0701   WBC 10*3/mm3 11.88*   HEMOGLOBIN g/dL 13.2   HEMATOCRIT % 41.2   PLATELETS 10*3/mm3 256     Results from last 7 days   Lab Units 02/14/19  0420 02/13/19  0434 02/12/19  1602 02/12/19  0426 02/11/19  0701   SODIUM mmol/L 136 138  --  138 139   POTASSIUM mmol/L 3.4* 3.5 4.1 3.4* 4.0   CHLORIDE mmol/L 100 102  --  103 106   CO2 mmol/L 29.0 29.0  --  29.0 28.0   BUN mg/dL 51* 47*  --  34* 32*   CREATININE mg/dL 2.39* " 2.53*  --  1.96* 1.72*   CALCIUM mg/dL 8.9 9.1  --  8.8 8.8   PHOSPHORUS mg/dL 6.0* 5.3*  --   --   --    MAGNESIUM mg/dL  --   --   --  1.7  --    ALBUMIN g/dL 3.83 3.88  --   --  3.90     Results from last 7 days   Lab Units 02/14/19  0420   GLUCOSE mg/dL 101*       Results from last 7 days   Lab Units 02/11/19  0701   ALK PHOS U/L 81   BILIRUBIN mg/dL 0.5   ALT (SGPT) U/L 14   AST (SGOT) U/L 18                 Estimated Creatinine Clearance: 35.6 mL/min (A) (by C-G formula based on SCr of 2.39 mg/dL (H)).      Assessment       Malignant hypertension    Dyslipidemia    Chronic systolic congestive heart failure (CMS/HCC)    Tobacco use    Chest pain            Impression: DOMO ON CKD. GFR BETTER. HTN BETTER. PROTEINURIA. NEVA/ANCA PENDING.            Recommendations: HOME OK WITH RENAL. F/U APPT WITH NAL 3/12/19.      James Starr MD  02/14/19  1:28 PM

## 2019-02-14 NOTE — TELEPHONE ENCOUNTER
Pt called in stating that he was supposed to have a heart cath done next week and he was not supposed to go back to work?    He was discharged today from the hospital with a note to see you in one week.       Any orders I need to put in for him?

## 2019-02-14 NOTE — DISCHARGE INSTR - APPOINTMENTS
[facility]  [address], [address]  [place] [id number] [phone]

## 2019-02-14 NOTE — PROGRESS NOTES
Case Management Discharge Note    Final Note: Mr. Iyer is being discharged home today. He denies having any discharge needs. PCP appointment made and placed in AVS for him. He plans on returning here next week for an outpatient heart cath. No other needs identified.    Destination      No service has been selected for the patient.      Durable Medical Equipment      No service has been selected for the patient.      Dialysis/Infusion      No service has been selected for the patient.      Home Medical Care      No service has been selected for the patient.      Community Resources      No service has been selected for the patient.             Final Discharge Disposition Code: 01 - home or self-care

## 2019-02-14 NOTE — NURSING NOTE
Pt stated Dr Wood told him he could not go back until he sees him again for a heart cath. Spoke to ERVIN Abel and she stated that pt needs a BMP in one week and then Dr Wood will decide if can do heart cath. Tried to get appt for pt with primary (Shana), but he had no openings until March 2.  stated he will not draw labs without seeing pt since he is a new pt. Pt stated he would call Dr Wood himself to get order for BMP

## 2019-02-15 ENCOUNTER — TELEPHONE (OUTPATIENT)
Dept: FAMILY MEDICINE CLINIC | Facility: CLINIC | Age: 64
End: 2019-02-15

## 2019-02-15 ENCOUNTER — READMISSION MANAGEMENT (OUTPATIENT)
Dept: CALL CENTER | Facility: HOSPITAL | Age: 64
End: 2019-02-15

## 2019-02-15 LAB
ANA SER QL: NEGATIVE
METANEPHRINE, PL: <10 PG/ML (ref 0–62)
NORMETANEPHRINE, PL: 61 PG/ML (ref 0–145)

## 2019-02-15 NOTE — PAYOR COMM NOTE
Rony He (63 y.o. Male)     Date of Birth Social Security Number Address Home Phone MRN    [id number]  [id number] [facility] [address] [phone] [id number]       Vital Signs     Row Name 02/14/19 0825 02/14/19 0750 02/14/19 0732 02/14/19 0600 02/14/19 0400       Vitals    Temp  --  --  97.9 °F (36.6 °C)  --  --    Temp src  --  --  Oral  --  --    Pulse  64  62  --  76  --    Heart Rate Source  --  Monitor  Monitor  --  --    Resp  --  16  17  --  --    Resp Rate Source  --  Visual  Visual  --  --    BP  --  --  144/99  170/98  --    BP Location  --  --  Right arm  --  --    BP Method  --  --  Automatic  --  --    Patient Position  --  --  Sitting  --  --       Oxygen Therapy    Device (Oxygen Therapy)  room air  room air  --  room air  room air    Row Name 02/14/19 0200 02/14/19 0000 02/13/19 2230 02/13/19 2123 02/13/19 2122       Vitals    Temp  --  --  --  --  98.3 °F (36.8 °C)    Temp src  --  --  --  --  Oral    Pulse  --  --  --  65  59    Heart Rate Source  --  --  --  --  Monitor    Resp  --  --  --  --  18    Resp Rate Source  --  --  --  --  Visual    BP  --  --  --  147/95  147/95    Noninvasive MAP (mmHg)  --  --  --  --  116    BP Location  --  --  --  --  Right arm    BP Method  --  --  --  --  Automatic    Patient Position  --  --  --  --  Lying       Oxygen Therapy    Device (Oxygen Therapy)  room air  room air  room air  room air  --    Row Name 02/13/19 1945 02/13/19 1931 02/13/19 1829 02/13/19 1638 02/13/19 1557       Vitals    Temp  --  --  --  97.8 °F (36.6 °C)  --    Temp src  --  --  --  Oral  --    Pulse  --  69  65  64  63    Heart Rate Source  --  --  --  Monitor  Monitor    Resp  --  18  --  18  16    Resp Rate Source  --  --  --  Visual  Visual    BP  --  --  145/87  141/85  --    Noninvasive MAP (mmHg)  --  --  --  109  --    BP Location  --  --  --  Right arm  --    BP Method  --  --  --  Automatic  --    Patient Position  --  --  --  Lying  --       Oxygen  Therapy    Device (Oxygen Therapy)  room air  room air  --  --  room air    Row Name 02/13/19 1426 02/13/19 1220 02/13/19 1103 02/13/19 1000 02/13/19 0930       Vitals    Pulse  67  69  67  --  68    Heart Rate Source  --  --  Monitor  --  --    Resp  --  --  20  --  --    Resp Rate Source  --  --  Visual  --  --    BP  131/82  146/82  --  --  145/84       Oxygen Therapy    Device (Oxygen Therapy)  --  --  room air  room air  room air    Row Name 02/13/19 0800 02/13/19 0739 02/13/19 0700 02/13/19 0600 02/13/19 0532       Vitals    Temp  98.5 °F (36.9 °C)  --  --  --  --    Temp src  Oral  --  --  --  --    Pulse  62  66  66  51  69    Heart Rate Source  Monitor  Monitor  --  --  --    Resp  18  16  --  --  --    Resp Rate Source  Visual  Visual  --  --  --    BP  138/89  --  136/80  148/73  --    Noninvasive MAP (mmHg)  109  --  114  106  --    BP Location  Right arm  --  --  --  --    BP Method  Automatic  --  --  --  --    Patient Position  Lying  --  --  --  --       Oxygen Therapy    SpO2  96 %  96 %  97 %  94 %  --    Device (Oxygen Therapy)  --  room air  --  room air  --    Row Name 02/13/19 0500 02/13/19 0400 02/13/19 0300 02/13/19 0200 02/13/19 0100       Vitals    Pulse  55  63  63  --  68    BP  120/66  132/75  116/60  --  133/82    Noninvasive MAP (mmHg)  92  104  82  --  105       Oxygen Therapy    SpO2  92 %  95 %  89 %  (Abnormal)   --  93 %    Device (Oxygen Therapy)  --  room air  --  room air  --    Row Name 02/13/19 0007 02/12/19 2300 02/12/19 2220 02/12/19 2009 02/12/19 2000       Vitals    Temp  --  --  --  --  98.1 °F (36.7 °C)    Temp src  --  --  --  --  Oral    Pulse  --  68  65  66  68    Heart Rate Source  --  --  --  --  Monitor    Resp  --  --  --  17  18    Resp Rate Source  --  --  --  --  Visual    BP  --  139/76  128/53  --  129/79    Noninvasive MAP (mmHg)  --  104  --  --  100    BP Location  --  --  --  --  Right arm    BP Method  --  --  --  --  Automatic    Patient Position  --   --  --  --  Sitting       Oxygen Therapy    SpO2  --  94 %  --  94 %  95 %    Pulse Oximetry Type  --  --  --  Continuous  --    Device (Oxygen Therapy)  room air  --  room air  room air  --    Row Name 02/12/19 1910 02/12/19 1815 02/12/19 1805 02/12/19 1719          Vitals    Pulse  --  --  68  62     Heart Rate Source  --  --  --  Monitor     Resp  --  --  --  16     Resp Rate Source  --  --  --  Visual     BP  --  --  142/92  --        Oxygen Therapy    SpO2  --  --  --  96 %     Pulse Oximetry Type  --  --  --  Continuous     Device (Oxygen Therapy)  room air  room air  --  nasal cannula     Flow (L/min)  --  --  --  2         Hospital Medications (all)       Dose Frequency Start End    labetalol (NORMODYNE,TRANDATE) injection 10 mg 10 mg Once 2/11/2019 2/11/2019    Sig - Route: Infuse 2 mL into a venous catheter 1 (One) Time. - Intravenous    metOLazone (ZAROXOLYN) tablet 5 mg 5 mg Once 2/11/2019 2/11/2019    Sig - Route: Take 2 tablets by mouth 1 (One) Time. - Oral    regadenoson (LEXISCAN) injection 0.4 mg 0.4 mg Once 2/13/2019 2/13/2019    Sig - Route: Infuse 5 mL into a venous catheter 1 (One) Time. - Intravenous    rubidium chloride (CARDIOGEN) injection 1 dose 1 dose Once in Imaging 2/13/2019 2/13/2019    Sig - Route: Infuse 1 dose into a venous catheter Once. - Intravenous    rubidium chloride (CARDIOGEN) injection 1 dose 1 dose Once in Imaging 2/13/2019 2/13/2019    Sig - Route: Infuse 1 dose into a venous catheter Once. - Intravenous    aspirin chewable tablet 81 mg (Discontinued) 81 mg Daily 2/12/2019 2/14/2019    Sig - Route: Chew 1 tablet Daily. - Oral    Reason for Discontinue: Patient Discharge    atorvastatin (LIPITOR) tablet 20 mg (Discontinued) 20 mg Daily 2/12/2019 2/14/2019    Sig - Route: Take 1 tablet by mouth Daily. - Oral    Reason for Discontinue: Patient Discharge    carvedilol (COREG) tablet 25 mg (Discontinued) 25 mg Every 12 Hours Scheduled 2/11/2019 2/14/2019    Sig - Route: Take 2  tablets by mouth Every 12 (Twelve) Hours. - Oral    Reason for Discontinue: Patient Discharge    CloNIDine (CATAPRES) tablet 0.1 mg (Discontinued) 0.1 mg Every 8 Hours Scheduled 2/11/2019 2/12/2019    Sig - Route: Take 1 tablet by mouth Every 8 (Eight) Hours. - Oral    CloNIDine (CATAPRES) tablet 0.2 mg (Discontinued) 0.2 mg Every 8 Hours Scheduled 2/12/2019 2/14/2019    Sig - Route: Take 2 tablets by mouth Every 8 (Eight) Hours. - Oral    Reason for Discontinue: Patient Discharge    furosemide (LASIX) injection 40 mg (Discontinued) 40 mg Every 12 Hours 2/11/2019 2/13/2019    Sig - Route: Infuse 4 mL into a venous catheter Every 12 (Twelve) Hours. - Intravenous    heparin (porcine) 5000 UNIT/ML injection 5,000 Units (Discontinued) 5,000 Units Every 8 Hours Scheduled 2/11/2019 2/14/2019    Sig - Route: Inject 1 mL under the skin into the appropriate area as directed Every 8 (Eight) Hours. - Subcutaneous    Reason for Discontinue: Patient Discharge    ipratropium-albuterol (DUO-NEB) nebulizer solution 3 mL (Discontinued) 3 mL 4 Times Daily - RT 2/11/2019 2/14/2019    Sig - Route: Take 3 mL by nebulization 4 (Four) Times a Day. - Nebulization    Reason for Discontinue: Patient Discharge    isosorbide mononitrate (IMDUR) 24 hr tablet 60 mg (Discontinued) 60 mg Daily 2/12/2019 2/11/2019    Sig - Route: Take 1 tablet by mouth Daily. - Oral    Reason for Discontinue: Drug interaction    labetalol (NORMODYNE,TRANDATE) injection 10 mg (Discontinued) 10 mg Once 2/11/2019 2/11/2019    Sig - Route: Infuse 2 mL into a venous catheter 1 (One) Time. - Intravenous    niCARdipine (CARDENE-IV) 20 mg/200 mL (0.1 mg/mL) in 0.9% NaCl infusion (Discontinued) 5-15 mg/hr Titrated 2/11/2019 2/14/2019    Sig - Route: Infuse 5-15 mg/hr into a venous catheter Dose Adjusted By Provider As Needed. - Intravenous    Reason for Discontinue: Patient Discharge    NIFEdipine XL (PROCARDIA XL) 24 hr tablet 90 mg (Discontinued) 90 mg Every 24 Hours  "Scheduled 2/12/2019 2/14/2019    Sig - Route: Take 90 mg by mouth Daily. - Oral    Reason for Discontinue: Patient Discharge    nitroglycerin (NITROSTAT) ointment 2 inch (Discontinued) 2 inch Every 6 Hours Scheduled 2/11/2019 2/14/2019    Sig - Route: Apply 2 inches topically to the appropriate area as directed Every 6 (Six) Hours. - Topical    Reason for Discontinue: Patient Discharge    nitroglycerin 50 mg/250 mL (0.2 mg/mL) infusion (Discontinued) 5-200 mcg/min Titrated 2/11/2019 2/14/2019    Sig - Route: Infuse 5-200 mcg/min into a venous catheter Dose Adjusted By Provider As Needed. - Intravenous    Reason for Discontinue: Patient Discharge    potassium chloride (KLOR-CON) packet 40 mEq (Discontinued) 40 mEq As Needed 2/12/2019 2/14/2019    Sig - Route: Take 40 mEq by mouth As Needed (potassium replacement, see admin instructions). - Oral    Reason for Discontinue: Patient Discharge    Linked Group 1:  \"Or\" Linked Group Details        potassium chloride (MICRO-K) CR capsule 40 mEq (Discontinued) 40 mEq As Needed 2/12/2019 2/14/2019    Sig - Route: Take 4 capsules by mouth As Needed (potassium replacement.  see admin instructions). - Oral    Reason for Discontinue: Patient Discharge    Linked Group 1:  \"Or\" Linked Group Details        potassium chloride 10 mEq in 100 mL IVPB (Discontinued) 10 mEq Every 1 Hour PRN 2/12/2019 2/14/2019    Sig - Route: Infuse 100 mL into a venous catheter Every 1 (One) Hour As Needed (potassium protocol PERIPHERAL - see admin instructions). - Intravenous    Reason for Discontinue: Patient Discharge    Linked Group 1:  \"Or\" Linked Group Details        sodium chloride 0.9 % flush 10 mL (Discontinued) 10 mL As Needed 2/11/2019 2/14/2019    Sig - Route: Infuse 10 mL into a venous catheter As Needed for Line Care. - Intravenous    Reason for Discontinue: Patient Discharge    Cosign for Ordering: Accepted by Josué Hutchison DO on 2/11/2019  7:01 AM    torsemide (DEMADEX) tablet 5 mg " "(Discontinued) 5 mg Daily PRN 2/14/2019 2/14/2019    Sig - Route: Take 0.5 tablets by mouth Daily As Needed (weight gain 2lbs in 24 hours). - Oral    Reason for Discontinue: Patient Discharge             Physician Progress Notes (last 72 hours) (Notes from 2/12/2019  4:30 PM through 2/15/2019  4:30 PM)      James Starr MD at 2/14/2019  1:28 PM             LOS: 3 days    Patient Care Team:  Provider, No Known as PCP - General    Reason For Visit:  F/U DOMO ON CKD  Subjective           Review of Systems:    Pulm: No soa   CV:  No CP      Objective       aspirin 81 mg Oral Daily   atorvastatin 20 mg Oral Daily   carvedilol 25 mg Oral Q12H   CloNIDine 0.2 mg Oral Q8H   heparin (porcine) 5,000 Units Subcutaneous Q8H   ipratropium-albuterol 3 mL Nebulization 4x Daily - RT   NIFEdipine XL 90 mg Oral Q24H   nitroglycerin 2 inch Topical Q6H       niCARdipine 5-15 mg/hr Last Rate: Stopped (02/13/19 0830)   nitroglycerin 5-200 mcg/min Last Rate: Stopped (02/11/19 1116)         Vital Signs:  Blood pressure 144/99, pulse 64, temperature 97.9 °F (36.6 °C), temperature source Oral, resp. rate 16, height 177 cm (69.69\"), weight 93 kg (205 lb), SpO2 96 %.    Flowsheet Rows      First Filed Value   Admission Height  177.8 cm (70\") Documented at 02/11/2019 0642   Admission Weight  93 kg (205 lb) Documented at 02/11/2019 0642          02/13 0701 - 02/14 0700  In: 925 [P.O.:925]  Out: 275 [Urine:275]    Physical Exam:    General Appearance: NAD, alert and cooperative, Ox3  Eyes: PER, conjunctivae and sclerae normal, no icterus  Lungs: respirations regular and unlabored, no crepitus, clear to auscultation  Heart/CV: regular rhythm & normal rate, no murmur, no gallop, no rub and no edema  Abdomen: not distended, soft, non-tender, no masses,  bowel sounds present  Skin: No rash, Warm and dry    Radiology:            Labs:  Results from last 7 days   Lab Units 02/11/19  0701   WBC 10*3/mm3 11.88*   HEMOGLOBIN g/dL 13.2 "   HEMATOCRIT % 41.2   PLATELETS 10*3/mm3 256     Results from last 7 days   Lab Units 02/14/19  0420 02/13/19  0434 02/12/19  1602 02/12/19  0426 02/11/19  0701   SODIUM mmol/L 136 138  --  138 139   POTASSIUM mmol/L 3.4* 3.5 4.1 3.4* 4.0   CHLORIDE mmol/L 100 102  --  103 106   CO2 mmol/L 29.0 29.0  --  29.0 28.0   BUN mg/dL 51* 47*  --  34* 32*   CREATININE mg/dL 2.39* 2.53*  --  1.96* 1.72*   CALCIUM mg/dL 8.9 9.1  --  8.8 8.8   PHOSPHORUS mg/dL 6.0* 5.3*  --   --   --    MAGNESIUM mg/dL  --   --   --  1.7  --    ALBUMIN g/dL 3.83 3.88  --   --  3.90     Results from last 7 days   Lab Units 02/14/19  0420   GLUCOSE mg/dL 101*       Results from last 7 days   Lab Units 02/11/19  0701   ALK PHOS U/L 81   BILIRUBIN mg/dL 0.5   ALT (SGPT) U/L 14   AST (SGOT) U/L 18                 Estimated Creatinine Clearance: 35.6 mL/min (A) (by C-G formula based on SCr of 2.39 mg/dL (H)).      Assessment       Malignant hypertension    Dyslipidemia    Chronic systolic congestive heart failure (CMS/HCC)    Tobacco use    Chest pain            Impression: DOMO ON CKD. GFR BETTER. HTN BETTER. PROTEINURIA. NEVA/ANCA PENDING.            Recommendations: HOME OK WITH RENAL. F/U APPT WITH NAL 3/12/19.      James Starr MD  02/14/19  1:28 PM          Electronically signed by James Starr MD at 2/14/2019  1:30 PM     Shirlene Christian PA at 2/14/2019  7:30 AM          [facility] Progress Note     LOS: 3 days   Patient Care Team:  Provider, No Known as PCP - General  PCP:  Provider, No Known    Chief Complaint:  Chf/elevated troponin    SUBJECTIVE:   Pt is feeling better. Dyspnea at baseline, no CP, palps.      Review of Systems:   All systems have been reviewed and are negative with the exception of those mentioned above.      OBJECTIVE:    Vital Sign Min/Max for last 24 hours  Temp  Min: 97.8 °F (36.6 °C)  Max: 98.5 °F (36.9 °C)   BP  Min: 131/82  Max: 170/98   Pulse  Min: 59   "Max: 76   Resp  Min: 16  Max: 20   SpO2  Min: 96 %  Max: 96 %   No Data Recorded   No Data Recorded     Flowsheet Rows      First Filed Value   Admission Height  177.8 cm (70\") Documented at 02/11/2019 0642   Admission Weight  93 kg (205 lb) Documented at 02/11/2019 0642          Telemetry:off tele      Intake/Output Summary (Last 24 hours) at 2/14/2019 0730  Last data filed at 2/13/2019 1700  Gross per 24 hour   Intake 925 ml   Output 275 ml   Net 650 ml     Intake & Output (last 3 days)       02/11 0701 - 02/12 0700 02/12 0701 - 02/13 0700 02/13 0701 - 02/14 0700 02/14 0701 - 02/15 0700    P.O.   925     Total Intake(mL/kg)   925 (9.9)     Urine (mL/kg/hr) 4165 (1.9) 2950 (1.3) 275 (0.1)     Stool   0     Total Output 4165 2950 275     Net -4165 -2950 +650             Stool Unmeasured Occurrence   1 x            Physical Exam:  Physical Exam   Constitutional: He is oriented to person, place, and time. He appears well-developed and well-nourished. No distress.   Cardiovascular: Normal rate, regular rhythm and intact distal pulses.   Murmur heard.  Pulses:       Radial pulses are 2+ on the right side, and 2+ on the left side.        Dorsalis pedis pulses are 2+ on the right side, and 2+ on the left side.        Posterior tibial pulses are 2+ on the right side, and 2+ on the left side.   Pulmonary/Chest: Effort normal and breath sounds normal. He has no wheezes. He has no rales.   Abdominal: Soft. Bowel sounds are normal. There is no tenderness. There is no guarding.   Musculoskeletal: He exhibits no edema or tenderness.   Neurological: He is alert and oriented to person, place, and time.   Skin: Skin is warm and dry. No rash noted.   Psychiatric: He has a normal mood and affect.   Nursing note and vitals reviewed.       LABS/DIAGNOSTIC DATA:  Results from last 7 days   Lab Units 02/11/19  0701   WBC 10*3/mm3 11.88*   HEMOGLOBIN g/dL 13.2   HEMATOCRIT % 41.2   PLATELETS 10*3/mm3 256     No results found for: " TROPONINT      Results from last 7 days   Lab Units 02/14/19  0420 02/13/19  0434 02/12/19  1602 02/12/19  0426 02/11/19  0701   SODIUM mmol/L 136 138  --  138 139   POTASSIUM mmol/L 3.4* 3.5 4.1 3.4* 4.0   CHLORIDE mmol/L 100 102  --  103 106   CO2 mmol/L 29.0 29.0  --  29.0 28.0   BUN mg/dL 51* 47*  --  34* 32*   CREATININE mg/dL 2.39* 2.53*  --  1.96* 1.72*   CALCIUM mg/dL 8.9 9.1  --  8.8 8.8   BILIRUBIN mg/dL  --   --   --   --  0.5   ALK PHOS U/L  --   --   --   --  81   ALT (SGPT) U/L  --   --   --   --  14   AST (SGOT) U/L  --   --   --   --  18   GLUCOSE mg/dL 101* 108*  --  97 120*     Results from last 7 days   Lab Units 02/11/19  1128   HEMOGLOBIN A1C % 5.70*     Results from last 7 days   Lab Units 02/12/19  0426   CHOLESTEROL mg/dL 163   TRIGLYCERIDES mg/dL 186*   HDL CHOL mg/dL 28*   LDL CHOL mg/dL 117         Results from last 7 days   Lab Units 02/11/19  0701   BNP pg/mL 1,581.0*       Medication Review:     aspirin 81 mg Oral Daily   atorvastatin 20 mg Oral Daily   carvedilol 25 mg Oral Q12H   CloNIDine 0.2 mg Oral Q8H   heparin (porcine) 5,000 Units Subcutaneous Q8H   ipratropium-albuterol 3 mL Nebulization 4x Daily - RT   NIFEdipine XL 90 mg Oral Q24H   nitroglycerin 2 inch Topical Q6H        niCARdipine 5-15 mg/hr Last Rate: Stopped (02/13/19 0830)   nitroglycerin 5-200 mcg/min Last Rate: Stopped (02/11/19 1116)          Malignant hypertension    Dyslipidemia    Chronic systolic congestive heart failure (CMS/HCC)    Tobacco use    Chest pain      ASSESSMENT/PLAN:  HTN  - uncontrolled, 269/130 at presentation. Improved post diuresis.  - continue increased doses of coreg, clonidine, nifedipine    Elevated troponin/CAD  - troponin up to 0.5  - LExiPET revealed moderate fixed inferior defect w decreased EF, similar to MPS done in [place] in 2017. Plan to revisit [facility] outpt once renal function and BP are stable  - continue ASA, statin, BB    Chronic Diastolic CHF  - echo 2/11 EF 55%  - BNP >1500  "upon presentation   - continue diuresis     A/CKD  - NAL following     HLD  - statin    Tobacco Abuse  - cessation counseling    Renal function now improving. D/u w BMP next week. BP much better. Home when ok per nephrology. Pt to f/u w Dr. Wood in 4 weeks.     Electronically signed by Shirlene Christian PA-C, 02/14/19, 7:30 AM.        Electronically signed by Shirlene Christian PA at 2/14/2019  1:59 PM     Job Wood MD at 2/13/2019  5:25 PM          [facility] Progress Note     LOS: 2 days   Patient Care Team:  Provider, No Known as PCP - General  PCP:  Provider, No Known    Chief Complaint:  Fu chf/htn    SUBJECTIVE: breathing better  bp much improved      Review of Systems:   All systems have been reviewed and are negative with the exception of those mentioned above.      OBJECTIVE:    Vital Sign Min/Max for last 24 hours  Temp  Min: 98.1 °F (36.7 °C)  Max: 98.5 °F (36.9 °C)   BP  Min: 116/60  Max: 148/73   Pulse  Min: 51  Max: 69   Resp  Min: 16  Max: 20   SpO2  Min: 89 %  Max: 97 %   No Data Recorded   No Data Recorded     Flowsheet Rows      First Filed Value   Admission Height  177.8 cm (70\") Documented at 02/11/2019 0642   Admission Weight  93 kg (205 lb) Documented at 02/11/2019 0642          Telemetry: sr      Intake/Output Summary (Last 24 hours) at 2/13/2019 1725  Last data filed at 2/13/2019 0800  Gross per 24 hour   Intake 0 ml   Output 2075 ml   Net -2075 ml     Intake & Output (last 3 days)       02/10 0701 - 02/11 0700 02/11 0701 - 02/12 0700 02/12 0701 - 02/13 0700 02/13 0701 - 02/14 0700    P.O.    0    Total Intake(mL/kg)    0 (0)    Urine (mL/kg/hr)  4165 (1.9) 2950 (1.3) 275 (0.3)    Stool    0    Total Output  4165 2950 275    Net  -4165 -2950 -275            Stool Unmeasured Occurrence    1 x           Physical Exam:  Physical Exam   Constitutional: He appears well-developed and well-nourished.   Neck: Normal range of motion. Neck supple. No " hepatojugular reflux and no JVD present. Carotid bruit is not present. No tracheal deviation present. No thyromegaly present.   Cardiovascular: Normal rate, regular rhythm, S1 normal, S2 normal, intact distal pulses and normal pulses. PMI is not displaced. Exam reveals no gallop, no distant heart sounds, no friction rub, no midsystolic click and no opening snap.   No murmur heard.  Pulses:       Radial pulses are 2+ on the right side, and 2+ on the left side.        Dorsalis pedis pulses are 2+ on the right side, and 2+ on the left side.        Posterior tibial pulses are 2+ on the right side, and 2+ on the left side.   Pulmonary/Chest: Effort normal and breath sounds normal. He has no wheezes. He has no rales.   Abdominal: Soft. Bowel sounds are normal. He exhibits no mass. There is no tenderness. There is no guarding.        LABS/DIAGNOSTIC DATA:  Results from last 7 days   Lab Units 02/11/19  0701   WBC 10*3/mm3 11.88*   HEMOGLOBIN g/dL 13.2   HEMATOCRIT % 41.2   PLATELETS 10*3/mm3 256     No results found for: TROPONINT      Results from last 7 days   Lab Units 02/13/19  0434 02/12/19  1602 02/12/19  0426 02/11/19  0701   SODIUM mmol/L 138  --  138 139   POTASSIUM mmol/L 3.5 4.1 3.4* 4.0   CHLORIDE mmol/L 102  --  103 106   CO2 mmol/L 29.0  --  29.0 28.0   BUN mg/dL 47*  --  34* 32*   CREATININE mg/dL 2.53*  --  1.96* 1.72*   CALCIUM mg/dL 9.1  --  8.8 8.8   BILIRUBIN mg/dL  --   --   --  0.5   ALK PHOS U/L  --   --   --  81   ALT (SGPT) U/L  --   --   --  14   AST (SGOT) U/L  --   --   --  18   GLUCOSE mg/dL 108*  --  97 120*     Results from last 7 days   Lab Units 02/11/19  1128   HEMOGLOBIN A1C % 5.70*     Results from last 7 days   Lab Units 02/12/19  0426   CHOLESTEROL mg/dL 163   TRIGLYCERIDES mg/dL 186*   HDL CHOL mg/dL 28*   LDL CHOL mg/dL 117         Results from last 7 days   Lab Units 02/11/19  0701   BNP pg/mL 1,581.0*       Medication Review:     aspirin 81 mg Oral Daily   atorvastatin 20 mg Oral  "Daily   carvedilol 25 mg Oral Q12H   CloNIDine 0.2 mg Oral Q8H   furosemide 40 mg Intravenous Q12H   heparin (porcine) 5,000 Units Subcutaneous Q8H   ipratropium-albuterol 3 mL Nebulization 4x Daily - RT   NIFEdipine XL 90 mg Oral Q24H   nitroglycerin 2 inch Topical Q6H        niCARdipine 5-15 mg/hr Last Rate: 5 mg/hr (02/13/19 0535)   nitroglycerin 5-200 mcg/min Last Rate: Stopped (02/11/19 1116)          Malignant hypertension    Dyslipidemia    Chronic systolic congestive heart failure (CMS/HCC)    Tobacco use    Chest pain      ASSESSMENT/PLAN:  HTN accelerated -improved post diuresis    CKD w transient increase in s creat. Dc lasix  Fu labs in am if stabilizing home    CHF/CAD stress w fixed inferior defect and LV dysfxn- not significantly different from MPS in [place] 2017  Would revisit [facility] as outpt once bp and gfr stable.            Job Wood MD   02/13/19  5:25 PM      Electronically signed by Job Wood MD at 2/13/2019  5:30 PM     James Starr MD at 2/13/2019 12:55 PM             LOS: 2 days    Patient Care Team:  Provider, No Known as PCP - General    Reason For Visit:  DOMO ON CKD  Subjective           Review of Systems:    Pulm: No soa   CV:  No CP      Objective       aspirin 81 mg Oral Daily   atorvastatin 20 mg Oral Daily   carvedilol 25 mg Oral Q12H   CloNIDine 0.2 mg Oral Q8H   furosemide 40 mg Intravenous Q12H   heparin (porcine) 5,000 Units Subcutaneous Q8H   ipratropium-albuterol 3 mL Nebulization 4x Daily - RT   NIFEdipine XL 90 mg Oral Q24H   nitroglycerin 2 inch Topical Q6H       niCARdipine 5-15 mg/hr Last Rate: 5 mg/hr (02/13/19 0535)   nitroglycerin 5-200 mcg/min Last Rate: Stopped (02/11/19 1116)         Vital Signs:  Blood pressure 146/82, pulse 69, temperature 98.1 °F (36.7 °C), temperature source Oral, resp. rate 20, height 177 cm (69.69\"), weight 93 kg (205 lb), SpO2 96 %.    Flowsheet Rows      First Filed Value   Admission Height  177.8 cm (70\") " Documented at 02/11/2019 0642   Admission Weight  93 kg (205 lb) Documented at 02/11/2019 0642          02/12 0701 - 02/13 0700  In: -   Out: 2950 [Urine:2950]    Physical Exam:    General Appearance: NAD, alert and cooperative, Ox3  Eyes: PER, conjunctivae and sclerae normal, no icterus  Lungs: respirations regular and unlabored, no crepitus, clear to auscultation  Heart/CV: regular rhythm & normal rate, no murmur, no gallop, no rub and no edema  Abdomen: not distended, soft, non-tender, no masses,  bowel sounds present  Skin: No rash, Warm and dry    Radiology: RENAL U/S: L KIDNEY 9.2 CM. R KIDNEY 11.3 CM. DOPPLER ? L MICHAEL.            Labs: . P/C RATIO 1401. U EOS NEG.  RENIN/ZHANG/METANEPHRINES PENDING.  Results from last 7 days   Lab Units 02/11/19  0701   WBC 10*3/mm3 11.88*   HEMOGLOBIN g/dL 13.2   HEMATOCRIT % 41.2   PLATELETS 10*3/mm3 256     Results from last 7 days   Lab Units 02/13/19  0434 02/12/19  1602 02/12/19  0426 02/11/19  0701   SODIUM mmol/L 138  --  138 139   POTASSIUM mmol/L 3.5 4.1 3.4* 4.0   CHLORIDE mmol/L 102  --  103 106   CO2 mmol/L 29.0  --  29.0 28.0   BUN mg/dL 47*  --  34* 32*   CREATININE mg/dL 2.53*  --  1.96* 1.72*   CALCIUM mg/dL 9.1  --  8.8 8.8   PHOSPHORUS mg/dL 5.3*  --   --   --    MAGNESIUM mg/dL  --   --  1.7  --    ALBUMIN g/dL 3.88  --   --  3.90     Results from last 7 days   Lab Units 02/13/19  0434   GLUCOSE mg/dL 108*       Results from last 7 days   Lab Units 02/11/19  0701   ALK PHOS U/L 81   BILIRUBIN mg/dL 0.5   ALT (SGPT) U/L 14   AST (SGOT) U/L 18                 Estimated Creatinine Clearance: 33.6 mL/min (A) (by C-G formula based on SCr of 2.53 mg/dL (H)).      Assessment       Malignant hypertension    Dyslipidemia    Chronic systolic congestive heart failure (CMS/HCC)    Tobacco use    Chest pain            Impression: DOMO ON CKD. WORSE GFR LIKELY FROM MALIGNANT HTN.  ? L MICHAEL. PROTEINURIA. HTN WITH BP BETTER.            Recommendations: PRESENT CARE.  "WOULD NOT DO RENAL ANGIOGRAM AT PRESENT. CHECK NEVA/ANCA. WOULD LIKE TO SEE RENAL FXN IMPROVE PRIOR TO DISCHARGE.      James Starr MD  02/13/19  12:55 PM          Electronically signed by James Starr MD at 2/13/2019  1:00 PM     Shirlene Christian PA at 2/13/2019  7:28 AM     Attestation signed by Job Wood MD at 2/14/2019  1:01 PM    I have reviewed the documentation above and agree.                    [facility] Progress Note     LOS: 2 days   Patient Care Team:  Provider, No Known as PCP - General  PCP:  Provider, No Known    Chief Complaint:  Chf/elevated troponin    SUBJECTIVE:   Pt in chair, receiving neb treatment. Reports he's feeling much better. BP is controlled. Expresses frustration that he hasn't been taken for his stress yet. Denies CP, palps, dyspnea above baseline      Review of Systems:   All systems have been reviewed and are negative with the exception of those mentioned above.      OBJECTIVE:    Vital Sign Min/Max for last 24 hours  Temp  Min: 97.7 °F (36.5 °C)  Max: 98.1 °F (36.7 °C)   BP  Min: 116/60  Max: 152/91   Pulse  Min: 51  Max: 70   Resp  Min: 16  Max: 18   SpO2  Min: 89 %  Max: 97 %   Flow (L/min)  Min: 2  Max: 3   No Data Recorded     Flowsheet Rows      First Filed Value   Admission Height  177.8 cm (70\") Documented at 02/11/2019 0642   Admission Weight  93 kg (205 lb) Documented at 02/11/2019 0642          Telemetry: sr      Intake/Output Summary (Last 24 hours) at 2/13/2019 0738  Last data filed at 2/13/2019 0600  Gross per 24 hour   Intake --   Output 2950 ml   Net -2950 ml     Intake & Output (last 3 days)       02/10 0701 - 02/11 0700 02/11 0701 - 02/12 0700 02/12 0701 - 02/13 0700 02/13 0701 - 02/14 0700    Urine (mL/kg/hr)  4165 (1.9) 2950 (1.3)     Total Output  4165 2950     Net  -4165 -2950                    Physical Exam:  Physical Exam   Constitutional: He is oriented to person, place, and time. He " appears well-developed and well-nourished. No distress.   Cardiovascular: Normal rate, regular rhythm and intact distal pulses.   Murmur heard.  Pulses:       Radial pulses are 2+ on the right side, and 2+ on the left side.        Dorsalis pedis pulses are 2+ on the right side, and 2+ on the left side.        Posterior tibial pulses are 2+ on the right side, and 2+ on the left side.   Pulmonary/Chest: Effort normal and breath sounds normal. He has no wheezes. He has no rales.   Abdominal: Soft. Bowel sounds are normal. There is no tenderness. There is no guarding.   Musculoskeletal: He exhibits no edema or tenderness.   Neurological: He is alert and oriented to person, place, and time.   Skin: Skin is warm and dry. No rash noted.   Psychiatric: He has a normal mood and affect.   Nursing note and vitals reviewed.       LABS/DIAGNOSTIC DATA:  Results from last 7 days   Lab Units 02/11/19  0701   WBC 10*3/mm3 11.88*   HEMOGLOBIN g/dL 13.2   HEMATOCRIT % 41.2   PLATELETS 10*3/mm3 256     No results found for: TROPONINT      Results from last 7 days   Lab Units 02/13/19  0434 02/12/19  1602 02/12/19  0426 02/11/19  0701   SODIUM mmol/L 138  --  138 139   POTASSIUM mmol/L 3.5 4.1 3.4* 4.0   CHLORIDE mmol/L 102  --  103 106   CO2 mmol/L 29.0  --  29.0 28.0   BUN mg/dL 47*  --  34* 32*   CREATININE mg/dL 2.53*  --  1.96* 1.72*   CALCIUM mg/dL 9.1  --  8.8 8.8   BILIRUBIN mg/dL  --   --   --  0.5   ALK PHOS U/L  --   --   --  81   ALT (SGPT) U/L  --   --   --  14   AST (SGOT) U/L  --   --   --  18   GLUCOSE mg/dL 108*  --  97 120*     Results from last 7 days   Lab Units 02/11/19  1128   HEMOGLOBIN A1C % 5.70*     Results from last 7 days   Lab Units 02/12/19  0426   CHOLESTEROL mg/dL 163   TRIGLYCERIDES mg/dL 186*   HDL CHOL mg/dL 28*   LDL CHOL mg/dL 117         Results from last 7 days   Lab Units 02/11/19  0701   BNP pg/mL 1,581.0*       Medication Review:     aspirin 81 mg Oral Daily   atorvastatin 20 mg Oral Daily    carvedilol 25 mg Oral Q12H   CloNIDine 0.2 mg Oral Q8H   furosemide 40 mg Intravenous Q12H   heparin (porcine) 5,000 Units Subcutaneous Q8H   ipratropium-albuterol 3 mL Nebulization 4x Daily - RT   NIFEdipine XL 90 mg Oral Q24H   nitroglycerin 2 inch Topical Q6H        niCARdipine 5-15 mg/hr Last Rate: 5 mg/hr (02/13/19 0535)   nitroglycerin 5-200 mcg/min Last Rate: Stopped (02/11/19 1116)          Malignant hypertension    Dyslipidemia    Chronic systolic congestive heart failure (CMS/HCC)    Tobacco use    Chest pain      ASSESSMENT/PLAN:  HTN  - uncontrolled, 269/130 at presentation.   - htn labs ordered, not available for review as of yet  - Improved w cardene  - continue coreg, clonidine, nifedipine  - cardene can be weaned. will adjust Po meds as necessary     Elevated troponin/CAD  - troponin up to 0.5  - LExiPET today. Pt would be high risk for renal injury if cath is needed d/t worsening renal status  - continue ASA, statin, BB    Chronic Diastolic CHF  - echo 2/11 EF 55%  - BNP >1500 upon presentation   - continue diuresis     A/CKD  - NAL following    HLD  - statin    Tobacco Abuse  - cessation counseling        Electronically signed by ERVIN Abel, 02/13/19, 7:28 AM.     Electronically signed by Job Wood MD at 2/14/2019  1:01 PM

## 2019-02-15 NOTE — OUTREACH NOTE
Prep Survey      Responses   Facility patient discharged from?  [place]   Is patient eligible?  Yes   Discharge diagnosis  Malignant Htn,  CHF, Chest pain   Does the patient have one of the following disease processes/diagnoses(primary or secondary)?  CHF   Does the patient have Home health ordered?  No   Is there a DME ordered?  No   Prep survey completed?  Yes          Linh Pham RN

## 2019-02-15 NOTE — TELEPHONE ENCOUNTER
Called pt back today and advised him that he needs to wait until next tues and go have lab work done. From there waiting on results of blood work and then set up the heart cath if possible.     Pt was advised to stay off of work until the heart cath is cleared.

## 2019-02-15 NOTE — TELEPHONE ENCOUNTER
The patient state he is doing well today and feeling better.  He denies any chest pain, SOB, dizziness, lightheadedness, n/v. He states he is monitoring BP at home with BP check today 116/70 post-meds.  He states he picked up all new medications and taking as prescribed.  He denies any questions, concerns, or needs at time of call.  Confirmed New PCP appt with Dr. Saldana 3/2/19. TCM nto applicable with Samantha ROMERO.

## 2019-02-16 ENCOUNTER — READMISSION MANAGEMENT (OUTPATIENT)
Dept: CALL CENTER | Facility: HOSPITAL | Age: 64
End: 2019-02-16

## 2019-02-16 LAB — ALDOST SERPL-MCNC: 21.5 NG/DL (ref 0–30)

## 2019-02-16 NOTE — OUTREACH NOTE
CHF Week 1 Survey      Responses   Facility patient discharged from?  [place]   Does the patient have one of the following disease processes/diagnoses(primary or secondary)?  CHF   Is there a successful TCM telephone encounter documented?  No   CHF Week 1 attempt successful?  Yes   Call start time  1030   Call end time  1035   Discharge diagnosis  Malignant Htn,  CHF, Chest pain   Is patient permission given to speak with other caregiver?  Yes   List who call center can speak with  son   Meds reviewed with patient/caregiver?  Yes   Is the patient having any side effects they believe may be caused by any medication additions or changes?  No   Does the patient have all medications ordered at discharge?  Yes   Is the patient taking all medications as directed (includes completed medication regime)?  Yes   Does the patient have a primary care provider?   Yes   Does the patient have an appointment with their PCP within 7 days of discharge?  Yes   Has the patient kept scheduled appointments due by today?  N/A   Psychosocial issues?  No   Comments  Monitoring -136/70   Did the patient receive a copy of their discharge instructions?  Yes   Nursing interventions  Reviewed instructions with patient   What is the patient's perception of their health status since discharge?  Improving   Nursing interventions  Nurse provided patient education   Is the patient weighing daily?  Yes   Does the patient have scales?  Yes   Daily weight interventions  Education provided on importance of daily weight   Is the patient able to teach back Heart Failure diet management?  Yes   Is the patient able to teach back signs and symptoms of worsening condition? (i.e. weight gain, shortness of air, etc.)  Yes   Is the patient/caregiver able to teach back the hierarchy of who to call/visit for symptoms/problems? PCP, Specialist, Home health nurse, Urgent Care, ED, 911  Yes   Additional teach back comments  Pt has stopped drinking the  2Liter/day of diet soda per his report. Educated on importance of low NA diet.     CHF Week 1 call completed?  Yes          Bertha Gauthier RN

## 2019-02-17 LAB
C-ANCA TITR SER IF: NORMAL TITER
MYELOPEROXIDASE AB SER-ACNC: <9 U/ML (ref 0–9)
P-ANCA ATYPICAL TITR SER IF: NORMAL TITER
P-ANCA TITR SER IF: NORMAL TITER
PROTEINASE3 AB SER IA-ACNC: <3.5 U/ML (ref 0–3.5)

## 2019-02-19 ENCOUNTER — LAB (OUTPATIENT)
Dept: LAB | Facility: HOSPITAL | Age: 64
End: 2019-02-19

## 2019-02-19 DIAGNOSIS — I10 ESSENTIAL HYPERTENSION: ICD-10-CM

## 2019-02-19 LAB
ANION GAP SERPL CALCULATED.3IONS-SCNC: 8 MMOL/L (ref 3–11)
BUN BLD-MCNC: 42 MG/DL (ref 9–23)
BUN/CREAT SERPL: 22.1 (ref 7–25)
CALCIUM SPEC-SCNC: 8.9 MG/DL (ref 8.7–10.4)
CHLORIDE SERPL-SCNC: 102 MMOL/L (ref 99–109)
CO2 SERPL-SCNC: 29 MMOL/L (ref 20–31)
CREAT BLD-MCNC: 1.9 MG/DL (ref 0.6–1.3)
GFR SERPL CREATININE-BSD FRML MDRD: 36 ML/MIN/1.73
GLUCOSE BLD-MCNC: 116 MG/DL (ref 70–100)
POTASSIUM BLD-SCNC: 4.7 MMOL/L (ref 3.5–5.5)
SODIUM BLD-SCNC: 139 MMOL/L (ref 132–146)

## 2019-02-19 PROCEDURE — 36415 COLL VENOUS BLD VENIPUNCTURE: CPT

## 2019-02-19 PROCEDURE — 80048 BASIC METABOLIC PNL TOTAL CA: CPT

## 2019-02-21 DIAGNOSIS — I16.0 HYPERTENSIVE URGENCY: Primary | ICD-10-CM

## 2019-02-21 NOTE — TELEPHONE ENCOUNTER
Mr He called in about his blood work. He had it done on tues. Waiting for a heart cath. His creat is still elevated 1.9. Wants to know where he goes from here because he needs to get back to work.

## 2019-02-22 PROBLEM — I16.0 HYPERTENSIVE URGENCY: Status: ACTIVE | Noted: 2019-02-22

## 2019-02-25 ENCOUNTER — PREP FOR SURGERY (OUTPATIENT)
Dept: OTHER | Facility: HOSPITAL | Age: 64
End: 2019-02-25

## 2019-02-25 ENCOUNTER — READMISSION MANAGEMENT (OUTPATIENT)
Dept: CALL CENTER | Facility: HOSPITAL | Age: 64
End: 2019-02-25

## 2019-02-25 DIAGNOSIS — R94.39 ABNORMAL MYOCARDIAL PERFUSION STUDY: Primary | ICD-10-CM

## 2019-02-25 RX ORDER — NITROGLYCERIN 0.4 MG/1
0.4 TABLET SUBLINGUAL
Status: CANCELLED | OUTPATIENT
Start: 2019-02-25

## 2019-02-25 RX ORDER — ASPIRIN 325 MG
325 TABLET ORAL ONCE
Status: CANCELLED | OUTPATIENT
Start: 2019-02-25 | End: 2019-02-25

## 2019-02-25 RX ORDER — SODIUM CHLORIDE 9 MG/ML
1-3 INJECTION, SOLUTION INTRAVENOUS CONTINUOUS
Status: CANCELLED | OUTPATIENT
Start: 2019-02-25

## 2019-02-25 RX ORDER — SODIUM CHLORIDE 0.9 % (FLUSH) 0.9 %
1-10 SYRINGE (ML) INJECTION AS NEEDED
Status: CANCELLED | OUTPATIENT
Start: 2019-02-25

## 2019-02-25 RX ORDER — ONDANSETRON 2 MG/ML
4 INJECTION INTRAMUSCULAR; INTRAVENOUS EVERY 6 HOURS PRN
Status: CANCELLED | OUTPATIENT
Start: 2019-02-25

## 2019-02-25 RX ORDER — ACETAMINOPHEN 325 MG/1
650 TABLET ORAL EVERY 4 HOURS PRN
Status: CANCELLED | OUTPATIENT
Start: 2019-02-25

## 2019-02-25 RX ORDER — ASPIRIN 325 MG
325 TABLET, DELAYED RELEASE (ENTERIC COATED) ORAL DAILY
Status: CANCELLED | OUTPATIENT
Start: 2019-02-26

## 2019-02-25 RX ORDER — SODIUM CHLORIDE 0.9 % (FLUSH) 0.9 %
3 SYRINGE (ML) INJECTION EVERY 12 HOURS SCHEDULED
Status: CANCELLED | OUTPATIENT
Start: 2019-02-25

## 2019-02-25 NOTE — OUTREACH NOTE
CHF Week 2 Survey      Responses   Facility patient discharged from?  [place]   Does the patient have one of the following disease processes/diagnoses(primary or secondary)?  CHF   Week 2 attempt successful?  Yes   Call start time  1534   Call end time  1539   Discharge diagnosis  Malignant Htn,  CHF, Chest pain   Meds reviewed with patient/caregiver?  Yes   Is the patient having any side effects they believe may be caused by any medication additions or changes?  No   Does the patient have all medications ordered at discharge?  Yes   Is the patient taking all medications as directed (includes completed medication regime)?  Yes   Does the patient have a primary care provider?   Yes   Does the patient have an appointment with their PCP within 7 days of discharge?  N/A   Has the patient kept scheduled appointments due by today?  N/A   Comments  PCI tomorrow.   Has home health visited the patient within 72 hours of discharge?  N/A   Psychosocial issues?  No   Did the patient receive a copy of their discharge instructions?  Yes   Nursing interventions  Reviewed instructions with patient   What is the patient's perception of their health status since discharge?  Improving   Nursing interventions  Nurse provided patient education   Is the patient weighing daily?  Yes   Does the patient have scales?  Yes   Daily weight interventions  Education provided on importance of daily weight   Is the patient able to teach back Heart Failure diet management?  Yes   Is the patient able to teach back Heart Failure Zones?  Yes   Is the patient able to teach back signs and symptoms of worsening condition? (i.e. weight gain, shortness of air, etc.)  Yes   Is the patient/caregiver able to teach back the hierarchy of who to call/visit for symptoms/problems? PCP, Specialist, Home health nurse, Urgent Care, ED, 911  Yes   CHF Week 2 call completed?  Yes          Bobby Hernandez RN

## 2019-02-26 ENCOUNTER — HOSPITAL ENCOUNTER (OUTPATIENT)
Facility: HOSPITAL | Age: 64
Discharge: HOME | End: 2019-02-26
Attending: INTERNAL MEDICINE | Admitting: INTERNAL MEDICINE

## 2019-02-26 ENCOUNTER — READMISSION MANAGEMENT (OUTPATIENT)
Dept: CALL CENTER | Facility: HOSPITAL | Age: 64
End: 2019-02-26

## 2019-02-26 VITALS
OXYGEN SATURATION: 94 % | HEIGHT: 70 IN | WEIGHT: 196.65 LBS | SYSTOLIC BLOOD PRESSURE: 161 MMHG | BODY MASS INDEX: 28.15 KG/M2 | RESPIRATION RATE: 18 BRPM | TEMPERATURE: 97.6 F | HEART RATE: 66 BPM | DIASTOLIC BLOOD PRESSURE: 86 MMHG

## 2019-02-26 DIAGNOSIS — I25.10 CORONARY ARTERY DISEASE INVOLVING NATIVE CORONARY ARTERY OF NATIVE HEART, ANGINA PRESENCE UNSPECIFIED: Primary | ICD-10-CM

## 2019-02-26 DIAGNOSIS — R94.39 ABNORMAL MYOCARDIAL PERFUSION STUDY: ICD-10-CM

## 2019-02-26 DIAGNOSIS — I16.0 HYPERTENSIVE URGENCY: ICD-10-CM

## 2019-02-26 LAB
ALBUMIN SERPL-MCNC: 4.26 G/DL (ref 3.2–4.8)
ALBUMIN/GLOB SERPL: 1.6 G/DL (ref 1.5–2.5)
ALP SERPL-CCNC: 79 U/L (ref 25–100)
ALT SERPL W P-5'-P-CCNC: 20 U/L (ref 7–40)
ANION GAP SERPL CALCULATED.3IONS-SCNC: 7 MMOL/L (ref 3–11)
ARTICHOKE IGE QN: 110 MG/DL (ref 0–130)
AST SERPL-CCNC: 14 U/L (ref 0–33)
BILIRUB SERPL-MCNC: 0.2 MG/DL (ref 0.3–1.2)
BUN BLD-MCNC: 34 MG/DL (ref 9–23)
BUN/CREAT SERPL: 18.8 (ref 7–25)
CALCIUM SPEC-SCNC: 9.3 MG/DL (ref 8.7–10.4)
CHLORIDE SERPL-SCNC: 104 MMOL/L (ref 99–109)
CHOLEST SERPL-MCNC: 171 MG/DL (ref 0–200)
CO2 SERPL-SCNC: 27 MMOL/L (ref 20–31)
CREAT BLD-MCNC: 1.81 MG/DL (ref 0.6–1.3)
DEPRECATED RDW RBC AUTO: 44 FL (ref 37–54)
ERYTHROCYTE [DISTWIDTH] IN BLOOD BY AUTOMATED COUNT: 13.9 % (ref 11.3–14.5)
GFR SERPL CREATININE-BSD FRML MDRD: 38 ML/MIN/1.73
GLOBULIN UR ELPH-MCNC: 2.6 GM/DL
GLUCOSE BLD-MCNC: 116 MG/DL (ref 70–100)
HBA1C MFR BLD: 5.7 % (ref 4.8–5.6)
HCT VFR BLD AUTO: 38.7 % (ref 38.9–50.9)
HDLC SERPL-MCNC: 29 MG/DL (ref 40–60)
HGB BLD-MCNC: 12.5 G/DL (ref 13.1–17.5)
MCH RBC QN AUTO: 28 PG (ref 27–31)
MCHC RBC AUTO-ENTMCNC: 32.3 G/DL (ref 32–36)
MCV RBC AUTO: 86.8 FL (ref 80–99)
PLATELET # BLD AUTO: 315 10*3/MM3 (ref 150–450)
PMV BLD AUTO: 12.7 FL (ref 6–12)
POTASSIUM BLD-SCNC: 4.5 MMOL/L (ref 3.5–5.5)
PROT SERPL-MCNC: 6.9 G/DL (ref 5.7–8.2)
RBC # BLD AUTO: 4.46 10*6/MM3 (ref 4.2–5.76)
SODIUM BLD-SCNC: 138 MMOL/L (ref 132–146)
TRIGL SERPL-MCNC: 189 MG/DL (ref 0–150)
WBC NRBC COR # BLD: 10.48 10*3/MM3 (ref 3.5–10.8)

## 2019-02-26 PROCEDURE — 93571 IV DOP VEL&/PRESS C FLO 1ST: CPT | Performed by: INTERNAL MEDICINE

## 2019-02-26 PROCEDURE — C1894 INTRO/SHEATH, NON-LASER: HCPCS | Performed by: INTERNAL MEDICINE

## 2019-02-26 PROCEDURE — 0 IOPAMIDOL PER 1 ML: Performed by: INTERNAL MEDICINE

## 2019-02-26 PROCEDURE — C1874 STENT, COATED/COV W/DEL SYS: HCPCS | Performed by: INTERNAL MEDICINE

## 2019-02-26 PROCEDURE — 80061 LIPID PANEL: CPT | Performed by: NURSE PRACTITIONER

## 2019-02-26 PROCEDURE — 25010000002 BIVALIRUDIN 5 MG/ML: Performed by: INTERNAL MEDICINE

## 2019-02-26 PROCEDURE — C1769 GUIDE WIRE: HCPCS | Performed by: INTERNAL MEDICINE

## 2019-02-26 PROCEDURE — C9600 PERC DRUG-EL COR STENT SING: HCPCS | Performed by: INTERNAL MEDICINE

## 2019-02-26 PROCEDURE — 36415 COLL VENOUS BLD VENIPUNCTURE: CPT

## 2019-02-26 PROCEDURE — 25010000002 MIDAZOLAM PER 1 MG: Performed by: INTERNAL MEDICINE

## 2019-02-26 PROCEDURE — 25010000002 HEPARIN (PORCINE) PER 1000 UNITS: Performed by: INTERNAL MEDICINE

## 2019-02-26 PROCEDURE — 92928 PRQ TCAT PLMT NTRAC ST 1 LES: CPT | Performed by: INTERNAL MEDICINE

## 2019-02-26 PROCEDURE — 25010000002 FENTANYL CITRATE (PF) 100 MCG/2ML SOLUTION: Performed by: INTERNAL MEDICINE

## 2019-02-26 PROCEDURE — C1887 CATHETER, GUIDING: HCPCS | Performed by: INTERNAL MEDICINE

## 2019-02-26 PROCEDURE — 93458 L HRT ARTERY/VENTRICLE ANGIO: CPT | Performed by: INTERNAL MEDICINE

## 2019-02-26 PROCEDURE — 80053 COMPREHEN METABOLIC PANEL: CPT | Performed by: NURSE PRACTITIONER

## 2019-02-26 PROCEDURE — 83036 HEMOGLOBIN GLYCOSYLATED A1C: CPT | Performed by: NURSE PRACTITIONER

## 2019-02-26 PROCEDURE — 85027 COMPLETE CBC AUTOMATED: CPT | Performed by: NURSE PRACTITIONER

## 2019-02-26 DEVICE — XIENCE SIERRA™ EVEROLIMUS ELUTING CORONARY STENT SYSTEM 3.00 MM X 08 MM / RAPID-EXCHANGE
Type: IMPLANTABLE DEVICE | Status: FUNCTIONAL
Brand: XIENCE SIERRA™

## 2019-02-26 DEVICE — XIENCE SIERRA™ EVEROLIMUS ELUTING CORONARY STENT SYSTEM 3.00 MM X 18 MM / RAPID-EXCHANGE
Type: IMPLANTABLE DEVICE | Status: FUNCTIONAL
Brand: XIENCE SIERRA™

## 2019-02-26 RX ORDER — ONDANSETRON 2 MG/ML
4 INJECTION INTRAMUSCULAR; INTRAVENOUS EVERY 6 HOURS PRN
Status: DISCONTINUED | OUTPATIENT
Start: 2019-02-26 | End: 2019-02-26 | Stop reason: HOSPADM

## 2019-02-26 RX ORDER — HYDROCODONE BITARTRATE AND ACETAMINOPHEN 5; 325 MG/1; MG/1
1 TABLET ORAL EVERY 4 HOURS PRN
Status: DISCONTINUED | OUTPATIENT
Start: 2019-02-26 | End: 2019-02-26 | Stop reason: HOSPADM

## 2019-02-26 RX ORDER — ASPIRIN 325 MG
325 TABLET ORAL ONCE
Status: COMPLETED | OUTPATIENT
Start: 2019-02-26 | End: 2019-02-26

## 2019-02-26 RX ORDER — ATORVASTATIN CALCIUM 80 MG/1
80 TABLET, FILM COATED ORAL DAILY
Qty: 30 TABLET | Refills: 11 | Status: SHIPPED | OUTPATIENT
Start: 2019-02-26

## 2019-02-26 RX ORDER — ACETAMINOPHEN 325 MG/1
650 TABLET ORAL EVERY 4 HOURS PRN
Status: DISCONTINUED | OUTPATIENT
Start: 2019-02-26 | End: 2019-02-26 | Stop reason: HOSPADM

## 2019-02-26 RX ORDER — LIDOCAINE HYDROCHLORIDE 10 MG/ML
INJECTION, SOLUTION EPIDURAL; INFILTRATION; INTRACAUDAL; PERINEURAL AS NEEDED
Status: DISCONTINUED | OUTPATIENT
Start: 2019-02-26 | End: 2019-02-26 | Stop reason: HOSPADM

## 2019-02-26 RX ORDER — ASPIRIN 325 MG
325 TABLET, DELAYED RELEASE (ENTERIC COATED) ORAL DAILY
Status: DISCONTINUED | OUTPATIENT
Start: 2019-02-27 | End: 2019-02-26 | Stop reason: HOSPADM

## 2019-02-26 RX ORDER — SODIUM CHLORIDE 9 MG/ML
1-3 INJECTION, SOLUTION INTRAVENOUS CONTINUOUS
Status: DISCONTINUED | OUTPATIENT
Start: 2019-02-26 | End: 2019-02-26 | Stop reason: HOSPADM

## 2019-02-26 RX ORDER — NITROGLYCERIN 0.4 MG/1
0.4 TABLET SUBLINGUAL
Status: DISCONTINUED | OUTPATIENT
Start: 2019-02-26 | End: 2019-02-26 | Stop reason: HOSPADM

## 2019-02-26 RX ORDER — FENTANYL CITRATE 50 UG/ML
INJECTION, SOLUTION INTRAMUSCULAR; INTRAVENOUS AS NEEDED
Status: DISCONTINUED | OUTPATIENT
Start: 2019-02-26 | End: 2019-02-26 | Stop reason: HOSPADM

## 2019-02-26 RX ORDER — CLOPIDOGREL BISULFATE 75 MG/1
TABLET ORAL AS NEEDED
Status: DISCONTINUED | OUTPATIENT
Start: 2019-02-26 | End: 2019-02-26 | Stop reason: HOSPADM

## 2019-02-26 RX ORDER — MIDAZOLAM HYDROCHLORIDE 1 MG/ML
INJECTION INTRAMUSCULAR; INTRAVENOUS AS NEEDED
Status: DISCONTINUED | OUTPATIENT
Start: 2019-02-26 | End: 2019-02-26 | Stop reason: HOSPADM

## 2019-02-26 RX ORDER — DIPHENHYDRAMINE HYDROCHLORIDE 50 MG/ML
25 INJECTION INTRAMUSCULAR; INTRAVENOUS EVERY 6 HOURS PRN
Status: DISCONTINUED | OUTPATIENT
Start: 2019-02-26 | End: 2019-02-26 | Stop reason: HOSPADM

## 2019-02-26 RX ORDER — SODIUM CHLORIDE 0.9 % (FLUSH) 0.9 %
3 SYRINGE (ML) INJECTION EVERY 12 HOURS SCHEDULED
Status: DISCONTINUED | OUTPATIENT
Start: 2019-02-26 | End: 2019-02-26 | Stop reason: HOSPADM

## 2019-02-26 RX ORDER — CLOPIDOGREL BISULFATE 75 MG/1
75 TABLET ORAL DAILY
Qty: 30 TABLET | Refills: 11 | Status: SHIPPED | OUTPATIENT
Start: 2019-02-26

## 2019-02-26 RX ORDER — SODIUM CHLORIDE 0.9 % (FLUSH) 0.9 %
1-10 SYRINGE (ML) INJECTION AS NEEDED
Status: DISCONTINUED | OUTPATIENT
Start: 2019-02-26 | End: 2019-02-26 | Stop reason: HOSPADM

## 2019-02-26 RX ADMIN — SODIUM CHLORIDE 3 ML/KG/HR: 9 INJECTION, SOLUTION INTRAVENOUS at 06:39

## 2019-02-26 RX ADMIN — ASPIRIN 325 MG ORAL TABLET 325 MG: 325 PILL ORAL at 06:39

## 2019-02-26 NOTE — INTERVAL H&P NOTE
Patient was admitted after this note.  He had his blood pressure medications adjusted.  Also went underwent myocardial perfusion study which showed moderately sized severely fixed inferior defect.  Decreased LVEF of 25% (ejection fraction by echocardiogram of 55%).  Due to his renal function his cardiac catheterization was initially postponed to optimize this.  He presents today for this.  Left Williams's test is positive    JOE Álvarez

## 2019-02-26 NOTE — OUTREACH NOTE
CHF Week 3 Survey      Responses   Facility patient discharged from?  [place]   Does the patient have one of the following disease processes/diagnoses(primary or secondary)?  CHF   Week 3 attempt successful?  No   Revoke  Readmitted          Evy Betancourt RN

## 2019-02-27 ENCOUNTER — CALL CENTER PROGRAMS (OUTPATIENT)
Dept: CALL CENTER | Facility: HOSPITAL | Age: 64
End: 2019-02-27

## 2019-02-27 ENCOUNTER — HOSPITAL ENCOUNTER (OUTPATIENT)
Facility: HOSPITAL | Age: 64
Setting detail: HOSPITAL OUTPATIENT SURGERY
End: 2019-02-27
Attending: INTERNAL MEDICINE | Admitting: INTERNAL MEDICINE

## 2019-02-27 DIAGNOSIS — I25.10 CORONARY ARTERY DISEASE INVOLVING NATIVE CORONARY ARTERY OF NATIVE HEART, ANGINA PRESENCE UNSPECIFIED: ICD-10-CM

## 2019-02-27 NOTE — OUTREACH NOTE
PCI Survey      Responses   Facility patient discharged from?  [place]   Procedure date  02/26/19   PCI site:  Left, Arm   Performing MD Dr. Cody Nascimento   Attempt successful?  Yes   Call start time  1253   Call end time  1254   Is the patient taking prescribed medications:  ASA, Plavix   Nursing intervention  Reminded to continue to take prescribed medications   Nursing intervention  Patient education provided   Does the patient have an appointment scheduled with the cardiologist?  Yes   Did the patient feel prepared to go home on the same day as the procedure?  Yes   Is the patient satisfied with the same day discharge process?  Yes   PCI call completed  Yes          Bobby Hernandez RN

## 2019-02-27 NOTE — OUTREACH NOTE
PCI Survey      Responses   Facility patient discharged from?  [place]   Procedure date  02/26/19   PCI site:  Left, Arm   Performing MD Dr. Cody Nascimento   Attempt successful?  No   Unsuccessful attempts  Attempt 1          Bobby Hernandez RN

## 2019-03-01 ENCOUNTER — PREP FOR SURGERY (OUTPATIENT)
Dept: OTHER | Facility: HOSPITAL | Age: 64
End: 2019-03-01

## 2019-03-01 DIAGNOSIS — I20.9 ANGINA PECTORIS (HCC): Primary | ICD-10-CM

## 2019-03-01 RX ORDER — ACETAMINOPHEN 325 MG/1
650 TABLET ORAL EVERY 4 HOURS PRN
Status: CANCELLED | OUTPATIENT
Start: 2019-03-01

## 2019-03-01 RX ORDER — SODIUM CHLORIDE 0.9 % (FLUSH) 0.9 %
1-10 SYRINGE (ML) INJECTION AS NEEDED
Status: CANCELLED | OUTPATIENT
Start: 2019-03-01

## 2019-03-01 RX ORDER — SODIUM CHLORIDE 9 MG/ML
1-3 INJECTION, SOLUTION INTRAVENOUS CONTINUOUS
Status: CANCELLED | OUTPATIENT
Start: 2019-03-01

## 2019-03-01 RX ORDER — SODIUM CHLORIDE 0.9 % (FLUSH) 0.9 %
3 SYRINGE (ML) INJECTION EVERY 12 HOURS SCHEDULED
Status: CANCELLED | OUTPATIENT
Start: 2019-03-01

## 2019-03-01 RX ORDER — ONDANSETRON 2 MG/ML
4 INJECTION INTRAMUSCULAR; INTRAVENOUS EVERY 6 HOURS PRN
Status: CANCELLED | OUTPATIENT
Start: 2019-03-01

## 2019-03-01 RX ORDER — ASPIRIN 325 MG
325 TABLET, DELAYED RELEASE (ENTERIC COATED) ORAL DAILY
Status: CANCELLED | OUTPATIENT
Start: 2019-03-02

## 2019-03-01 RX ORDER — ASPIRIN 325 MG
325 TABLET ORAL ONCE
Status: CANCELLED | OUTPATIENT
Start: 2019-03-01 | End: 2019-03-01

## 2019-03-01 RX ORDER — NITROGLYCERIN 0.4 MG/1
0.4 TABLET SUBLINGUAL
Status: CANCELLED | OUTPATIENT
Start: 2019-03-01

## 2019-03-04 ENCOUNTER — APPOINTMENT (OUTPATIENT)
Dept: CT IMAGING | Facility: HOSPITAL | Age: 64
End: 2019-03-04

## 2019-03-04 ENCOUNTER — HOSPITAL ENCOUNTER (EMERGENCY)
Facility: HOSPITAL | Age: 64
Discharge: HOME | End: 2019-03-04
Attending: EMERGENCY MEDICINE | Admitting: EMERGENCY MEDICINE

## 2019-03-04 VITALS
HEIGHT: 70 IN | SYSTOLIC BLOOD PRESSURE: 165 MMHG | BODY MASS INDEX: 26.92 KG/M2 | WEIGHT: 188 LBS | OXYGEN SATURATION: 97 % | TEMPERATURE: 98.4 F | HEART RATE: 57 BPM | DIASTOLIC BLOOD PRESSURE: 94 MMHG | RESPIRATION RATE: 18 BRPM

## 2019-03-04 DIAGNOSIS — R31.0 GROSS HEMATURIA: Primary | ICD-10-CM

## 2019-03-04 DIAGNOSIS — S39.012A ACUTE MYOFASCIAL STRAIN OF LUMBAR REGION, INITIAL ENCOUNTER: ICD-10-CM

## 2019-03-04 DIAGNOSIS — I71.40 ABDOMINAL AORTIC ANEURYSM (AAA) WITHOUT RUPTURE (HCC): ICD-10-CM

## 2019-03-04 LAB
ALBUMIN SERPL-MCNC: 4.37 G/DL (ref 3.2–4.8)
ALBUMIN/GLOB SERPL: 1.6 G/DL (ref 1.5–2.5)
ALP SERPL-CCNC: 92 U/L (ref 25–100)
ALT SERPL W P-5'-P-CCNC: 17 U/L (ref 7–40)
ANION GAP SERPL CALCULATED.3IONS-SCNC: 8 MMOL/L (ref 3–11)
AST SERPL-CCNC: 18 U/L (ref 0–33)
BACTERIA UR QL AUTO: ABNORMAL /HPF
BASOPHILS # BLD AUTO: 0.05 10*3/MM3 (ref 0–0.2)
BASOPHILS NFR BLD AUTO: 0.5 % (ref 0–1)
BILIRUB SERPL-MCNC: 0.4 MG/DL (ref 0.3–1.2)
BILIRUB UR QL STRIP: NEGATIVE
BUN BLD-MCNC: 35 MG/DL (ref 9–23)
BUN/CREAT SERPL: 19.3 (ref 7–25)
CALCIUM SPEC-SCNC: 9.2 MG/DL (ref 8.7–10.4)
CHLORIDE SERPL-SCNC: 98 MMOL/L (ref 99–109)
CLARITY UR: CLEAR
CO2 SERPL-SCNC: 28 MMOL/L (ref 20–31)
COLOR UR: YELLOW
CREAT BLD-MCNC: 1.81 MG/DL (ref 0.6–1.3)
DEPRECATED RDW RBC AUTO: 42.5 FL (ref 37–54)
EOSINOPHIL # BLD AUTO: 0.26 10*3/MM3 (ref 0–0.3)
EOSINOPHIL NFR BLD AUTO: 2.4 % (ref 0–3)
ERYTHROCYTE [DISTWIDTH] IN BLOOD BY AUTOMATED COUNT: 13.9 % (ref 11.3–14.5)
GFR SERPL CREATININE-BSD FRML MDRD: 38 ML/MIN/1.73
GLOBULIN UR ELPH-MCNC: 2.7 GM/DL
GLUCOSE BLD-MCNC: 116 MG/DL (ref 70–100)
GLUCOSE UR STRIP-MCNC: NEGATIVE MG/DL
HCT VFR BLD AUTO: 39.3 % (ref 38.9–50.9)
HGB BLD-MCNC: 13.2 G/DL (ref 13.1–17.5)
HGB UR QL STRIP.AUTO: ABNORMAL
HOLD SPECIMEN: NORMAL
HOLD SPECIMEN: NORMAL
HYALINE CASTS UR QL AUTO: ABNORMAL /LPF
IMM GRANULOCYTES # BLD AUTO: 0.04 10*3/MM3 (ref 0–0.05)
IMM GRANULOCYTES NFR BLD AUTO: 0.4 % (ref 0–0.6)
KETONES UR QL STRIP: NEGATIVE
LEUKOCYTE ESTERASE UR QL STRIP.AUTO: NEGATIVE
LIPASE SERPL-CCNC: 46 U/L (ref 6–51)
LYMPHOCYTES # BLD AUTO: 1.18 10*3/MM3 (ref 0.6–4.8)
LYMPHOCYTES NFR BLD AUTO: 10.8 % (ref 24–44)
MCH RBC QN AUTO: 28.4 PG (ref 27–31)
MCHC RBC AUTO-ENTMCNC: 33.6 G/DL (ref 32–36)
MCV RBC AUTO: 84.5 FL (ref 80–99)
MONOCYTES # BLD AUTO: 0.89 10*3/MM3 (ref 0–1)
MONOCYTES NFR BLD AUTO: 8.2 % (ref 0–12)
NEUTROPHILS # BLD AUTO: 8.49 10*3/MM3 (ref 1.5–8.3)
NEUTROPHILS NFR BLD AUTO: 77.7 % (ref 41–71)
NITRITE UR QL STRIP: NEGATIVE
PH UR STRIP.AUTO: 5.5 [PH] (ref 5–8)
PLATELET # BLD AUTO: 286 10*3/MM3 (ref 150–450)
PMV BLD AUTO: 11.5 FL (ref 6–12)
POTASSIUM BLD-SCNC: 4 MMOL/L (ref 3.5–5.5)
PROT SERPL-MCNC: 7.1 G/DL (ref 5.7–8.2)
PROT UR QL STRIP: ABNORMAL
RBC # BLD AUTO: 4.65 10*6/MM3 (ref 4.2–5.76)
RBC # UR: ABNORMAL /HPF
REF LAB TEST METHOD: ABNORMAL
SODIUM BLD-SCNC: 134 MMOL/L (ref 132–146)
SP GR UR STRIP: 1.02 (ref 1–1.03)
SQUAMOUS #/AREA URNS HPF: ABNORMAL /HPF
UROBILINOGEN UR QL STRIP: ABNORMAL
WBC NRBC COR # BLD: 10.91 10*3/MM3 (ref 3.5–10.8)
WBC UR QL AUTO: ABNORMAL /HPF
WHOLE BLOOD HOLD SPECIMEN: NORMAL
WHOLE BLOOD HOLD SPECIMEN: NORMAL

## 2019-03-04 PROCEDURE — 81001 URINALYSIS AUTO W/SCOPE: CPT

## 2019-03-04 PROCEDURE — 85025 COMPLETE CBC W/AUTO DIFF WBC: CPT

## 2019-03-04 PROCEDURE — 80053 COMPREHEN METABOLIC PANEL: CPT

## 2019-03-04 PROCEDURE — 83690 ASSAY OF LIPASE: CPT

## 2019-03-04 PROCEDURE — 74176 CT ABD & PELVIS W/O CONTRAST: CPT

## 2019-03-04 PROCEDURE — 99284 EMERGENCY DEPT VISIT MOD MDM: CPT

## 2019-03-04 RX ORDER — SODIUM CHLORIDE 0.9 % (FLUSH) 0.9 %
10 SYRINGE (ML) INJECTION AS NEEDED
Status: DISCONTINUED | OUTPATIENT
Start: 2019-03-04 | End: 2019-03-04 | Stop reason: HOSPADM

## 2019-03-04 RX ORDER — ACETAMINOPHEN 500 MG
1000 TABLET ORAL ONCE
Status: COMPLETED | OUTPATIENT
Start: 2019-03-04 | End: 2019-03-04

## 2019-03-04 RX ADMIN — ACETAMINOPHEN 1000 MG: 500 TABLET, FILM COATED ORAL at 11:03

## 2019-03-04 NOTE — ED PROVIDER NOTES
Subjective   Rony He is a 63 y.o.male who presents to the ED with complaints of right flank pain. The patient reports his pain has been constant since it onset two days ago. He states his pain radiates into his right abdomen. His discomfort is alleviated when he lies on his right side and worsens when he changes positions. He has not taken any medication for his discomfort. He also complains of diaphoresis and hematuria, but he denies any nausea, cough, rhinorrhea, or dysuria. Additionally, he has a history of kidney stones, but he states his current discomfort is more severe than his previous sensations. He also has a history of CHF, an appendectomy, and HTN. There are no other complaints at this time.         History provided by:  Patient  Flank Pain   Pain location:  R flank  Pain quality: aching    Pain radiation: right abdomen.  Pain severity:  Moderate  Onset quality:  Sudden  Duration:  3 days  Timing:  Constant  Progression:  Unchanged  Chronicity:  New  Relieved by: lying on his right side.  Worsened by:  Position changes  Associated symptoms: hematuria    Associated symptoms: no cough, no dysuria and no nausea        Review of Systems   Constitutional: Positive for diaphoresis.   HENT: Negative for rhinorrhea.    Respiratory: Negative for cough.    Gastrointestinal: Positive for abdominal pain. Negative for nausea.   Genitourinary: Positive for flank pain and hematuria. Negative for dysuria.   All other systems reviewed and are negative.      Past Medical History:   Diagnosis Date   • CHF (congestive heart failure) (CMS/HCC)    • Dyslipidemia    • Hyperlipidemia    • Hypertension    • Probable coronary artery disease    • Unstable angina (CMS/HCC)        Allergies   Allergen Reactions   • Amlodipine Swelling   • Penicillins Myalgia       Past Surgical History:   Procedure Laterality Date   • APPENDECTOMY     • CARDIAC CATHETERIZATION     • CARDIAC CATHETERIZATION N/A 2/26/2019    Procedure:  Left Heart Cath;  Surgeon: Cody Nascimento MD;  Location:  [facility];  Service: Cardiovascular   • HERNIA REPAIR      X 2       Family History   Problem Relation Age of Onset   • Hypertension Father    • Heart attack Father    • Diabetes Father        Social History     Socioeconomic History   • Marital status:      Spouse name: Not on file   • Number of children: Not on file   • Years of education: Not on file   • Highest education level: Not on file   Tobacco Use   • Smoking status: Former Smoker     Last attempt to quit:      Years since quittin.1   • Smokeless tobacco: Never Used   Substance and Sexual Activity   • Alcohol use: No   • Drug use: No   • Sexual activity: Defer         Objective   Physical Exam   Constitutional: He is oriented to person, place, and time. He appears well-developed and well-nourished. No distress.   HENT:   Head: Normocephalic and atraumatic.   Nose: Nose normal.   Mouth/Throat: Oropharynx is clear and moist.   Airway patent. Pharynx benign.    Eyes: Conjunctivae and EOM are normal. Pupils are equal, round, and reactive to light. No scleral icterus.   Neck: Normal range of motion. Neck supple.   Cardiovascular: Normal rate, regular rhythm and normal heart sounds.   No murmur heard.  Pulmonary/Chest: Effort normal and breath sounds normal. No respiratory distress. He has no wheezes. He has no rales.   Abdominal: Soft. Bowel sounds are normal. There is tenderness in the right upper quadrant. There is no guarding.   Right upper quadrant and right flank tenderness.    Musculoskeletal: Normal range of motion. He exhibits tenderness. He exhibits no edema.   No pretibial edema. Low right lumbar muscle tenderness.    Lymphadenopathy:     He has no cervical adenopathy.   Neurological: He is alert and oriented to person, place, and time.   Skin: Skin is warm and dry.   Psychiatric: He has a normal mood and affect. His behavior is normal.   Nursing note and  vitals reviewed.      Procedures         ED Course     Recent Results (from the past 24 hour(s))   Urinalysis With Microscopic If Indicated (No Culture) - Urine, Clean Catch    Collection Time: 03/04/19 10:24 AM   Result Value Ref Range    Color, UA Yellow Yellow, Straw    Appearance, UA Clear Clear    pH, UA 5.5 5.0 - 8.0    Specific Gravity, UA 1.020 1.001 - 1.030    Glucose, UA Negative Negative    Ketones, UA Negative Negative    Bilirubin, UA Negative Negative    Blood, UA Large (3+) (A) Negative    Protein, UA >=300 mg/dL (3+) (A) Negative    Leuk Esterase, UA Negative Negative    Nitrite, UA Negative Negative    Urobilinogen, UA 0.2 E.U./dL 0.2 - 1.0 E.U./dL   Urinalysis, Microscopic Only - Urine, Clean Catch    Collection Time: 03/04/19 10:24 AM   Result Value Ref Range    RBC, UA Too Numerous to Count (A) None Seen, 0-2 /HPF    WBC, UA 3-5 (A) None Seen, 0-2 /HPF    Bacteria, UA None Seen None Seen, Trace /HPF    Squamous Epithelial Cells, UA 0-2 None Seen, 0-2 /HPF    Hyaline Casts, UA 0-6 0 - 6 /LPF    Methodology Automated Microscopy    Comprehensive Metabolic Panel    Collection Time: 03/04/19 10:26 AM   Result Value Ref Range    Glucose 116 (H) 70 - 100 mg/dL    BUN 35 (H) 9 - 23 mg/dL    Creatinine 1.81 (H) 0.60 - 1.30 mg/dL    Sodium 134 132 - 146 mmol/L    Potassium 4.0 3.5 - 5.5 mmol/L    Chloride 98 (L) 99 - 109 mmol/L    CO2 28.0 20.0 - 31.0 mmol/L    Calcium 9.2 8.7 - 10.4 mg/dL    Total Protein 7.1 5.7 - 8.2 g/dL    Albumin 4.37 3.20 - 4.80 g/dL    ALT (SGPT) 17 7 - 40 U/L    AST (SGOT) 18 0 - 33 U/L    Alkaline Phosphatase 92 25 - 100 U/L    Total Bilirubin 0.4 0.3 - 1.2 mg/dL    eGFR Non African Amer 38 (L) >60 mL/min/1.73    Globulin 2.7 gm/dL    A/G Ratio 1.6 1.5 - 2.5 g/dL    BUN/Creatinine Ratio 19.3 7.0 - 25.0    Anion Gap 8.0 3.0 - 11.0 mmol/L   Lipase    Collection Time: 03/04/19 10:26 AM   Result Value Ref Range    Lipase 46 6 - 51 U/L   Light Blue Top    Collection Time: 03/04/19  10:26 AM   Result Value Ref Range    Extra Tube hold for add-on    Green Top (Gel)    Collection Time: 03/04/19 10:26 AM   Result Value Ref Range    Extra Tube Hold for add-ons.    Lavender Top    Collection Time: 03/04/19 10:26 AM   Result Value Ref Range    Extra Tube hold for add-on    Gold Top - SST    Collection Time: 03/04/19 10:26 AM   Result Value Ref Range    Extra Tube Hold for add-ons.    CBC Auto Differential    Collection Time: 03/04/19 10:26 AM   Result Value Ref Range    WBC 10.91 (H) 3.50 - 10.80 10*3/mm3    RBC 4.65 4.20 - 5.76 10*6/mm3    Hemoglobin 13.2 13.1 - 17.5 g/dL    Hematocrit 39.3 38.9 - 50.9 %    MCV 84.5 80.0 - 99.0 fL    MCH 28.4 27.0 - 31.0 pg    MCHC 33.6 32.0 - 36.0 g/dL    RDW 13.9 11.3 - 14.5 %    RDW-SD 42.5 37.0 - 54.0 fl    MPV 11.5 6.0 - 12.0 fL    Platelets 286 150 - 450 10*3/mm3    Neutrophil % 77.7 (H) 41.0 - 71.0 %    Lymphocyte % 10.8 (L) 24.0 - 44.0 %    Monocyte % 8.2 0.0 - 12.0 %    Eosinophil % 2.4 0.0 - 3.0 %    Basophil % 0.5 0.0 - 1.0 %    Immature Grans % 0.4 0.0 - 0.6 %    Neutrophils, Absolute 8.49 (H) 1.50 - 8.30 10*3/mm3    Lymphocytes, Absolute 1.18 0.60 - 4.80 10*3/mm3    Monocytes, Absolute 0.89 0.00 - 1.00 10*3/mm3    Eosinophils, Absolute 0.26 0.00 - 0.30 10*3/mm3    Basophils, Absolute 0.05 0.00 - 0.20 10*3/mm3    Immature Grans, Absolute 0.04 0.00 - 0.05 10*3/mm3     Note: In addition to lab results from this visit, the labs listed above may include labs taken at another facility or during a different encounter within the last 24 hours. Please correlate lab times with ED admission and discharge times for further clarification of the services performed during this visit.    CT Abdomen Pelvis Without Contrast   Final Result   1 mm possible calcification seen of the right distal ureter   suggesting a nonobstructing stone. There is diverticulosis with no   evidence of diverticulitis. Stones in the gallbladder as well as a   stable infrarenal abdominal aortic  aneurysm.       D:  03/04/2019   E:  03/04/2019           This report was finalized on 3/4/2019 12:01 PM by Dr. Karen Martinez MD.            Vitals:    03/04/19 1115 03/04/19 1124 03/04/19 1130 03/04/19 1145   BP: 156/90  154/92 165/94   Pulse:  57     Resp:       Temp:       SpO2: 96%  94% 97%   Weight:       Height:         Medications   acetaminophen (TYLENOL) tablet 1,000 mg (1,000 mg Oral Given 3/4/19 1103)     ECG/EMG Results (last 24 hours)     ** No results found for the last 24 hours. **        No orders to display                       MDM    Final diagnoses:   Gross hematuria   Acute myofascial strain of lumbar region, initial encounter   Abdominal aortic aneurysm (AAA) without rupture (CMS/[facility])       Documentation assistance provided by isidoro Bain.  Information recorded by the scribe was done at my direction and has been verified and validated by me.     Edgardo Bain  03/04/19 1037       , Edgardo  03/04/19 1039       , Edgardo  03/04/19 1114       , Edgardo  03/04/19 1150       , Edgardo  03/04/19 1152       Sancho Saul MD  03/04/19 [id number]

## 2019-03-04 NOTE — DISCHARGE INSTRUCTIONS
Activity as tolerated.     See your primary care provider in one to two weeks if you have ongoing back pain.    Follow up with your primary care provider in six months regarding your aortic aneurysm.

## 2019-03-11 ENCOUNTER — TELEPHONE (OUTPATIENT)
Dept: CARDIOLOGY | Facility: CLINIC | Age: 64
End: 2019-03-11

## 2019-03-11 NOTE — TELEPHONE ENCOUNTER
Pt called in stating that he no longer has a job or insurance and needs to have the procedures and appointments cancelled due to this. He will call back with his new insurance when he gets everything figured out.

## 2019-03-12 ENCOUNTER — DOCUMENTATION (OUTPATIENT)
Dept: CARDIAC REHAB | Facility: HOSPITAL | Age: 64
End: 2019-03-12

## 2019-03-12 NOTE — PROGRESS NOTES
Patient returned call to staff.  Staff discussed benefits of exercise, program protocol, and educational material provided. Teach back verified.  Patient declines participation in the program at this time due to no insurance.

## 2019-04-05 ENCOUNTER — OFFICE VISIT (OUTPATIENT)
Dept: FAMILY MEDICINE CLINIC | Facility: CLINIC | Age: 64
End: 2019-04-05

## 2019-04-05 VITALS
BODY MASS INDEX: 28.29 KG/M2 | RESPIRATION RATE: 16 BRPM | SYSTOLIC BLOOD PRESSURE: 142 MMHG | HEART RATE: 68 BPM | OXYGEN SATURATION: 98 % | HEIGHT: 70 IN | WEIGHT: 197.6 LBS | TEMPERATURE: 97.9 F | DIASTOLIC BLOOD PRESSURE: 86 MMHG

## 2019-04-05 DIAGNOSIS — E78.5 DYSLIPIDEMIA: ICD-10-CM

## 2019-04-05 DIAGNOSIS — I25.118 CORONARY ARTERY DISEASE OF NATIVE ARTERY OF NATIVE HEART WITH STABLE ANGINA PECTORIS (HCC): ICD-10-CM

## 2019-04-05 DIAGNOSIS — I10 ESSENTIAL HYPERTENSION: Primary | ICD-10-CM

## 2019-04-05 DIAGNOSIS — Z72.0 TOBACCO ABUSE: ICD-10-CM

## 2019-04-05 DIAGNOSIS — N18.30 CKD (CHRONIC KIDNEY DISEASE) STAGE 3, GFR 30-59 ML/MIN (HCC): ICD-10-CM

## 2019-04-05 PROCEDURE — 99204 OFFICE O/P NEW MOD 45 MIN: CPT | Performed by: INTERNAL MEDICINE

## 2019-04-05 RX ORDER — NICOTINE 21 MG/24HR
1 PATCH, TRANSDERMAL 24 HOURS TRANSDERMAL EVERY 24 HOURS
Qty: 14 EACH | Refills: 0 | Status: SHIPPED | OUTPATIENT
Start: 2019-04-05 | End: 2020-11-10

## 2019-04-05 RX ORDER — LOSARTAN POTASSIUM 50 MG/1
50 TABLET ORAL DAILY
Qty: 30 TABLET | Refills: 5 | Status: SHIPPED | OUTPATIENT
Start: 2019-04-05 | End: 2020-11-10

## 2019-04-05 RX ORDER — BUPROPION HYDROCHLORIDE 150 MG/1
150 TABLET, EXTENDED RELEASE ORAL 2 TIMES DAILY
Qty: 60 TABLET | Refills: 2 | Status: SHIPPED | OUTPATIENT
Start: 2019-04-05 | End: 2020-11-10

## 2019-04-05 NOTE — PROGRESS NOTES
Chief Complaint:  Establish care, hypertension, COPD    HPI:  Rony He is a 63 y.o. male who presents today for establish care and follow-up of multiple chronic medical conditions.  Patient has a history of coronary artery disease with 2 stents, diastolic congestive heart failure, chronic kidney disease stage III, hypertension.  Patient also is continuing to smoke 15 cigarettes/day.  He has no acute complaints or concerns today.  Denies any shortness of breath or chest pain.  He was referred to PCP by his cardiologist.    ROS:  Constitutional: no fevers, night sweats or unexplained weight loss  Eyes: no vision changes  ENT: no runny nose, ear pain, sore throat  Cardio: no chest pain, palpitations  Pulm: no shortness of breath, wheezing, or cough  GI: no abdominal pain or changes in bowel movements  : no difficulty urinating  MSK: no difficulty ambulating, no joint pain  Neuro: no weakness, dizziness or headache  Psych: no trouble sleeping  Endo: no change in appetite      Past Medical History:   Diagnosis Date   • Angina at rest (CMS/[facility])    • CHF (congestive heart failure) (CMS/[facility])    • Dyslipidemia    • Heart failure (CMS/HCC)    • Heart murmur    • Hyperlipidemia    • Hypertension    • Kidney stone    • Myocardial infarction (CMS/[facility])     admitted to the [facility] three time due to heart   • Probable coronary artery disease    • Rheumatic fever    • Unstable angina (CMS/[facility])       Family History   Problem Relation Age of Onset   • Cancer Mother    • Hypertension Father    • Heart attack Father    • Diabetes Father    • Hyperlipidemia Father       Social History     Socioeconomic History   • Marital status:      Spouse name: Not on file   • Number of children: Not on file   • Years of education: Not on file   • Highest education level: Not on file   Tobacco Use   • Smoking status: Former Smoker     Last attempt to quit:      Years since quittin.2   • Smokeless tobacco: Never Used    Substance and Sexual Activity   • Alcohol use: No   • Drug use: No   • Sexual activity: Defer      Allergies   Allergen Reactions   • Amlodipine Swelling   • Penicillins Myalgia        There is no immunization history on file for this patient.     PE:  Vitals:    04/05/19 1326   BP: 142/86   Pulse: 68   Resp: 16   Temp: 97.9 °F (36.6 °C)   SpO2: 98%        Gen Appearance: NAD  HEENT: Normocephalic, PERRLA, no thyromegaly, trache midline  Heart: RRR, normal S1 and S2, no murmur  Lungs: CTA b/l, no wheezing, no crackles  Abdomen: Soft, non-tender, non-distended, no guarding and BSx4  MSK: Moves all extremities well, normal gait, no peripheral edema  Pulses: Palpable and equal b/l  Lymph nodes: No palpable lymphadenopathy   Neuro: No focal deficits      Current Outpatient Medications   Medication Sig Dispense Refill   • aspirin 81 MG chewable tablet Chew 1 tablet Daily. 90 tablet 3   • atorvastatin (LIPITOR) 80 MG tablet Take 1 tablet by mouth Daily. 30 tablet 11   • carvedilol (COREG) 25 MG tablet Take 1 tablet by mouth Every 12 (Twelve) Hours. 180 tablet 3   • CloNIDine (CATAPRES) 0.2 MG tablet Take 1 tablet by mouth Every 8 (Eight) Hours. 180 tablet 3   • clopidogrel (PLAVIX) 75 MG tablet Take 1 tablet by mouth Daily. 30 tablet 11   • isosorbide mononitrate (IMDUR) 60 MG 24 hr tablet TAKE 1 TABLET BY MOUTH ONCE DAILY 30 tablet 3   • NIFEdipine XL (PROCARDIA XL) 60 MG 24 hr tablet Take 60 mg by mouth Daily.     • torsemide (DEMADEX) 5 MG tablet Take 1 tablet by mouth Daily As Needed (weight gain 2lbs in 24 hours). 30 tablet 3   • buPROPion SR (WELLBUTRIN SR) 150 MG 12 hr tablet Take 1 tablet by mouth 2 (Two) Times a Day. 60 tablet 2   • losartan (COZAAR) 50 MG tablet Take 1 tablet by mouth Daily. 30 tablet 5   • nicotine (NICODERM CQ) 14 MG/24HR patch Place 1 patch on the skin as directed by provider Daily. 14 each 0   • nicotine (NICODERM CQ) 21 MG/24HR patch Place 1 patch on the skin as directed by provider  Daily. 14 each 0   • nicotine (NICODERM CQ) 7 MG/24HR patch Place 1 patch on the skin as directed by provider Daily. 14 each 0     No current facility-administered medications for this visit.         Rony was seen today for establish care.    Diagnoses and all orders for this visit:    Essential hypertension  -     losartan (COZAAR) 50 MG tablet; Take 1 tablet by mouth Daily.  Adding on ACE inhibitor for stage III CKD and slightly elevated blood pressure today.  His goal would be less than 130/80.  Currently 142/86 today.  Counseled patient on importance of medicine compliance and especially when taking clonidine.  Coronary artery disease of native artery of native heart with stable angina pectoris (CMS/[facility])  Stable.  Follows with cardiology.  Continue aspirin and statin beta-blocker and Plavix.  Denies any current chest pain.  Dyslipidemia  Stable on statin.  CKD (chronic kidney disease) stage 3, GFR 30-59 ml/min (CMS/[facility])  GFR consistently slightly above 30.  Recommend referral to nephrology if he drops below.  Will need to work on blood pressure control.  Starting ARB today.  Tobacco abuse  -     nicotine (NICODERM CQ) 21 MG/24HR patch; Place 1 patch on the skin as directed by provider Daily.  -     nicotine (NICODERM CQ) 14 MG/24HR patch; Place 1 patch on the skin as directed by provider Daily.  -     nicotine (NICODERM CQ) 7 MG/24HR patch; Place 1 patch on the skin as directed by provider Daily.  -     buPROPion SR (WELLBUTRIN SR) 150 MG 12 hr tablet; Take 1 tablet by mouth 2 (Two) Times a Day.  Starting Wellbutrin today.  And also weaning down with nicotine patch.  Follow-up 1 month.  Take 1 tablet daily of Wellbutrin to start.  May increase to 2 tablets daily after the first week if tolerated.       Return in about 1 month (around 5/5/2019).

## 2019-04-12 ENCOUNTER — LAB (OUTPATIENT)
Dept: LAB | Facility: HOSPITAL | Age: 64
End: 2019-04-12

## 2019-04-12 DIAGNOSIS — I25.118 CORONARY ARTERY DISEASE OF NATIVE ARTERY OF NATIVE HEART WITH STABLE ANGINA PECTORIS (HCC): ICD-10-CM

## 2019-04-12 DIAGNOSIS — I25.118 CORONARY ARTERY DISEASE OF NATIVE ARTERY OF NATIVE HEART WITH STABLE ANGINA PECTORIS (HCC): Primary | ICD-10-CM

## 2019-04-12 LAB
ALBUMIN SERPL-MCNC: 4 G/DL (ref 3.5–5.2)
ALBUMIN/GLOB SERPL: 1.3 G/DL
ALP SERPL-CCNC: 87 U/L (ref 39–117)
ALT SERPL W P-5'-P-CCNC: 15 U/L (ref 1–41)
ANION GAP SERPL CALCULATED.3IONS-SCNC: 13.8 MMOL/L
AST SERPL-CCNC: 12 U/L (ref 1–40)
BILIRUB SERPL-MCNC: 0.2 MG/DL (ref 0.2–1.2)
BUN BLD-MCNC: 41 MG/DL (ref 8–23)
BUN/CREAT SERPL: 21 (ref 7–25)
CALCIUM SPEC-SCNC: 8.7 MG/DL (ref 8.6–10.5)
CHLORIDE SERPL-SCNC: 99 MMOL/L (ref 98–107)
CO2 SERPL-SCNC: 25.2 MMOL/L (ref 22–29)
CREAT BLD-MCNC: 1.95 MG/DL (ref 0.76–1.27)
GFR SERPL CREATININE-BSD FRML MDRD: 35 ML/MIN/1.73
GLOBULIN UR ELPH-MCNC: 3.1 GM/DL
GLUCOSE BLD-MCNC: 167 MG/DL (ref 65–99)
POTASSIUM BLD-SCNC: 4.4 MMOL/L (ref 3.5–5.2)
PROT SERPL-MCNC: 7.1 G/DL (ref 6–8.5)
SODIUM BLD-SCNC: 138 MMOL/L (ref 136–145)

## 2019-04-12 PROCEDURE — 36415 COLL VENOUS BLD VENIPUNCTURE: CPT

## 2019-04-12 PROCEDURE — 80053 COMPREHEN METABOLIC PANEL: CPT

## 2019-04-24 ENCOUNTER — TELEPHONE (OUTPATIENT)
Dept: CARDIOLOGY | Facility: CLINIC | Age: 64
End: 2019-04-24

## 2019-04-24 NOTE — TELEPHONE ENCOUNTER
----- Message from Job Wood MD sent at 4/19/2019  4:33 PM EDT -----  If no active cp hold LHC at current

## 2019-05-28 RX ORDER — ISOSORBIDE MONONITRATE 60 MG/1
TABLET, EXTENDED RELEASE ORAL
Qty: 90 TABLET | Refills: 3 | Status: ON HOLD | OUTPATIENT
Start: 2019-05-28 | End: 2020-11-12 | Stop reason: SDUPTHER

## 2020-11-10 ENCOUNTER — APPOINTMENT (OUTPATIENT)
Dept: PREADMISSION TESTING | Facility: HOSPITAL | Age: 65
End: 2020-11-10

## 2020-11-10 ENCOUNTER — ANESTHESIA EVENT (OUTPATIENT)
Dept: PERIOP | Facility: HOSPITAL | Age: 65
End: 2020-11-10

## 2020-11-10 ENCOUNTER — HOSPITAL ENCOUNTER (OUTPATIENT)
Dept: GENERAL RADIOLOGY | Facility: HOSPITAL | Age: 65
Discharge: HOME | End: 2020-11-10

## 2020-11-10 VITALS — BODY MASS INDEX: 28.59 KG/M2 | WEIGHT: 199.74 LBS | HEIGHT: 70 IN

## 2020-11-10 LAB
ABO GROUP BLD: NORMAL
ANION GAP SERPL CALCULATED.3IONS-SCNC: 12 MMOL/L (ref 5–15)
BLD GP AB SCN SERPL QL: NEGATIVE
BUN SERPL-MCNC: 39 MG/DL (ref 8–23)
BUN/CREAT SERPL: 20 (ref 7–25)
CALCIUM SPEC-SCNC: 8.9 MG/DL (ref 8.6–10.5)
CHLORIDE SERPL-SCNC: 104 MMOL/L (ref 98–107)
CO2 SERPL-SCNC: 25 MMOL/L (ref 22–29)
CREAT SERPL-MCNC: 1.95 MG/DL (ref 0.76–1.27)
DEPRECATED RDW RBC AUTO: 46 FL (ref 37–54)
ERYTHROCYTE [DISTWIDTH] IN BLOOD BY AUTOMATED COUNT: 14.7 % (ref 12.3–15.4)
GFR SERPL CREATININE-BSD FRML MDRD: 35 ML/MIN/1.73
GLUCOSE SERPL-MCNC: 108 MG/DL (ref 65–99)
HBA1C MFR BLD: 5.8 % (ref 4.8–5.6)
HCT VFR BLD AUTO: 34.4 % (ref 37.5–51)
HGB BLD-MCNC: 10.6 G/DL (ref 13–17.7)
MCH RBC QN AUTO: 26.6 PG (ref 26.6–33)
MCHC RBC AUTO-ENTMCNC: 30.8 G/DL (ref 31.5–35.7)
MCV RBC AUTO: 86.2 FL (ref 79–97)
PLATELET # BLD AUTO: 249 10*3/MM3 (ref 140–450)
PMV BLD AUTO: 11.7 FL (ref 6–12)
POTASSIUM SERPL-SCNC: 4.6 MMOL/L (ref 3.5–5.2)
RBC # BLD AUTO: 3.99 10*6/MM3 (ref 4.14–5.8)
RH BLD: POSITIVE
SARS-COV-2 RNA RESP QL NAA+PROBE: NOT DETECTED
SODIUM SERPL-SCNC: 141 MMOL/L (ref 136–145)
T&S EXPIRATION DATE: NORMAL
WBC # BLD AUTO: 9.77 10*3/MM3 (ref 3.4–10.8)

## 2020-11-10 PROCEDURE — 80048 BASIC METABOLIC PNL TOTAL CA: CPT

## 2020-11-10 PROCEDURE — C9803 HOPD COVID-19 SPEC COLLECT: HCPCS

## 2020-11-10 PROCEDURE — 36415 COLL VENOUS BLD VENIPUNCTURE: CPT

## 2020-11-10 PROCEDURE — 85027 COMPLETE CBC AUTOMATED: CPT

## 2020-11-10 PROCEDURE — 86901 BLOOD TYPING SEROLOGIC RH(D): CPT

## 2020-11-10 PROCEDURE — 83036 HEMOGLOBIN GLYCOSYLATED A1C: CPT

## 2020-11-10 PROCEDURE — 86923 COMPATIBILITY TEST ELECTRIC: CPT

## 2020-11-10 PROCEDURE — 86850 RBC ANTIBODY SCREEN: CPT

## 2020-11-10 PROCEDURE — 93010 ELECTROCARDIOGRAM REPORT: CPT | Performed by: INTERNAL MEDICINE

## 2020-11-10 PROCEDURE — 71046 X-RAY EXAM CHEST 2 VIEWS: CPT

## 2020-11-10 PROCEDURE — 93005 ELECTROCARDIOGRAM TRACING: CPT

## 2020-11-10 PROCEDURE — 87635 SARS-COV-2 COVID-19 AMP PRB: CPT

## 2020-11-10 PROCEDURE — 86900 BLOOD TYPING SEROLOGIC ABO: CPT

## 2020-11-10 RX ORDER — FUROSEMIDE 40 MG/1
40 TABLET ORAL 2 TIMES DAILY
COMMUNITY

## 2020-11-10 RX ORDER — HYDRALAZINE HYDROCHLORIDE 100 MG/1
100 TABLET, FILM COATED ORAL 3 TIMES DAILY
COMMUNITY

## 2020-11-10 RX ORDER — NIFEDIPINE 90 MG/1
90 TABLET, EXTENDED RELEASE ORAL DAILY
COMMUNITY

## 2020-11-10 RX ORDER — FAMOTIDINE 10 MG/ML
20 INJECTION, SOLUTION INTRAVENOUS ONCE
Status: CANCELLED | OUTPATIENT
Start: 2020-11-10 | End: 2020-11-10

## 2020-11-10 RX ORDER — SODIUM CHLORIDE, SODIUM LACTATE, POTASSIUM CHLORIDE, CALCIUM CHLORIDE 600; 310; 30; 20 MG/100ML; MG/100ML; MG/100ML; MG/100ML
9 INJECTION, SOLUTION INTRAVENOUS CONTINUOUS
Status: CANCELLED | OUTPATIENT
Start: 2020-11-10

## 2020-11-10 RX ORDER — GUAIFENESIN 600 MG/1
600 TABLET, EXTENDED RELEASE ORAL DAILY
COMMUNITY

## 2020-11-10 NOTE — PAT
Patient to apply Chlorhexadine wipes  to surgical area (as instructed) the night before procedure and the AM of procedure. Wipes provided.  Blood bank bracelet applied to patient during Pre Admission Testing visit.  Patient instructed not to remove from arm until after procedure and they are discharged from the hospital.  Explained to patient that they may shower and get the bracelet wet, but not to immerse under water for longer periods (bathing, swimming, hand dishwashing, etc).  Patient verbalized understanding.  An arrival time for procedure was not given during PAT visit. If patient had any questions or concerns about their arrival time, they were instructed to contact their surgeon/physician.  Additionally, if the patient referred to an arrival time that was acquired from their my chart account, patient was encouraged to verify that time with their surgeon/physician.  NO arrival times given in Pre Admission Testing Department.  Dr. Guillory reviewed EKG and fax cover sheet which was faxed to him-patient was cleared from his standpoint.

## 2020-11-11 ENCOUNTER — ANESTHESIA (OUTPATIENT)
Dept: PERIOP | Facility: HOSPITAL | Age: 65
End: 2020-11-11

## 2020-11-11 ENCOUNTER — APPOINTMENT (OUTPATIENT)
Dept: GENERAL RADIOLOGY | Facility: HOSPITAL | Age: 65
End: 2020-11-11

## 2020-11-11 ENCOUNTER — HOSPITAL ENCOUNTER (INPATIENT)
Facility: HOSPITAL | Age: 65
LOS: 1 days | Discharge: HOME | End: 2020-11-12
Attending: SURGERY | Admitting: SURGERY

## 2020-11-11 DIAGNOSIS — I71.40 ABDOMINAL AORTIC ANEURYSM (AAA) WITHOUT RUPTURE (HCC): Primary | Chronic | ICD-10-CM

## 2020-11-11 PROBLEM — Z99.81 ON HOME OXYGEN THERAPY: Status: ACTIVE | Noted: 2020-11-11

## 2020-11-11 PROBLEM — I70.1 RENAL ARTERY STENOSIS (HCC): Chronic | Status: ACTIVE | Noted: 2020-11-11

## 2020-11-11 PROBLEM — I71.9 AORTIC ANEURYSM WITHOUT RUPTURE (HCC): Chronic | Status: ACTIVE | Noted: 2020-11-11

## 2020-11-11 PROBLEM — I70.1 RENAL ARTERY STENOSIS (HCC): Status: ACTIVE | Noted: 2020-11-11

## 2020-11-11 PROBLEM — N18.30 CKD (CHRONIC KIDNEY DISEASE) STAGE 3, GFR 30-59 ML/MIN (HCC): Chronic | Status: ACTIVE | Noted: 2020-11-11

## 2020-11-11 PROBLEM — Z99.81 ON HOME OXYGEN THERAPY: Chronic | Status: ACTIVE | Noted: 2020-11-11

## 2020-11-11 PROBLEM — I50.22 CHRONIC SYSTOLIC CONGESTIVE HEART FAILURE (HCC): Chronic | Status: ACTIVE | Noted: 2017-10-19

## 2020-11-11 PROBLEM — I71.9 AORTIC ANEURYSM WITHOUT RUPTURE (HCC): Status: ACTIVE | Noted: 2020-11-11

## 2020-11-11 PROBLEM — Z87.891 FORMER SMOKER: Status: ACTIVE | Noted: 2020-11-11

## 2020-11-11 PROBLEM — I25.10 CORONARY ARTERY DISEASE INVOLVING NATIVE CORONARY ARTERY OF NATIVE HEART: Chronic | Status: ACTIVE | Noted: 2019-02-27

## 2020-11-11 PROBLEM — Z87.891 FORMER SMOKER: Chronic | Status: ACTIVE | Noted: 2020-11-11

## 2020-11-11 PROBLEM — N18.30 CKD (CHRONIC KIDNEY DISEASE) STAGE 3, GFR 30-59 ML/MIN (HCC): Status: ACTIVE | Noted: 2020-11-11

## 2020-11-11 LAB
ABO GROUP BLD: NORMAL
ANION GAP SERPL CALCULATED.3IONS-SCNC: 10 MMOL/L (ref 5–15)
ANION GAP SERPL CALCULATED.3IONS-SCNC: 9 MMOL/L (ref 5–15)
BUN SERPL-MCNC: 35 MG/DL (ref 8–23)
BUN SERPL-MCNC: 37 MG/DL (ref 8–23)
BUN/CREAT SERPL: 17.5 (ref 7–25)
BUN/CREAT SERPL: 19.5 (ref 7–25)
CALCIUM SPEC-SCNC: 8.2 MG/DL (ref 8.6–10.5)
CALCIUM SPEC-SCNC: 8.3 MG/DL (ref 8.6–10.5)
CHLORIDE SERPL-SCNC: 108 MMOL/L (ref 98–107)
CHLORIDE SERPL-SCNC: 109 MMOL/L (ref 98–107)
CO2 SERPL-SCNC: 22 MMOL/L (ref 22–29)
CO2 SERPL-SCNC: 22 MMOL/L (ref 22–29)
CREAT SERPL-MCNC: 1.9 MG/DL (ref 0.76–1.27)
CREAT SERPL-MCNC: 2 MG/DL (ref 0.76–1.27)
GFR SERPL CREATININE-BSD FRML MDRD: 34 ML/MIN/1.73
GFR SERPL CREATININE-BSD FRML MDRD: 36 ML/MIN/1.73
GLUCOSE SERPL-MCNC: 114 MG/DL (ref 65–99)
GLUCOSE SERPL-MCNC: 124 MG/DL (ref 65–99)
HCT VFR BLD AUTO: 29.3 % (ref 37.5–51)
HGB BLD-MCNC: 9.2 G/DL (ref 13–17.7)
MAGNESIUM SERPL-MCNC: 2.5 MG/DL (ref 1.6–2.4)
POTASSIUM SERPL-SCNC: 5 MMOL/L (ref 3.5–5.2)
POTASSIUM SERPL-SCNC: 5.3 MMOL/L (ref 3.5–5.2)
QT INTERVAL: 458 MS
QT INTERVAL: 520 MS
QTC INTERVAL: 476 MS
QTC INTERVAL: 483 MS
RH BLD: POSITIVE
SODIUM SERPL-SCNC: 139 MMOL/L (ref 136–145)
SODIUM SERPL-SCNC: 141 MMOL/L (ref 136–145)

## 2020-11-11 PROCEDURE — 93005 ELECTROCARDIOGRAM TRACING: CPT | Performed by: SURGERY

## 2020-11-11 PROCEDURE — 94799 UNLISTED PULMONARY SVC/PX: CPT

## 2020-11-11 PROCEDURE — C1894 INTRO/SHEATH, NON-LASER: HCPCS | Performed by: SURGERY

## 2020-11-11 PROCEDURE — C1769 GUIDE WIRE: HCPCS | Performed by: SURGERY

## 2020-11-11 PROCEDURE — 25010000002 HYDRALAZINE PER 20 MG: Performed by: NURSE ANESTHETIST, CERTIFIED REGISTERED

## 2020-11-11 PROCEDURE — 94640 AIRWAY INHALATION TREATMENT: CPT

## 2020-11-11 PROCEDURE — 25010000002 HYDROMORPHONE PER 4 MG: Performed by: SURGERY

## 2020-11-11 PROCEDURE — 25010000002 HEPARIN (PORCINE) PER 1000 UNITS: Performed by: NURSE ANESTHETIST, CERTIFIED REGISTERED

## 2020-11-11 PROCEDURE — C2628 CATHETER, OCCLUSION: HCPCS | Performed by: SURGERY

## 2020-11-11 PROCEDURE — 99232 SBSQ HOSP IP/OBS MODERATE 35: CPT | Performed by: INTERNAL MEDICINE

## 2020-11-11 PROCEDURE — 80048 BASIC METABOLIC PNL TOTAL CA: CPT | Performed by: SURGERY

## 2020-11-11 PROCEDURE — 85018 HEMOGLOBIN: CPT | Performed by: SURGERY

## 2020-11-11 PROCEDURE — 85014 HEMATOCRIT: CPT | Performed by: SURGERY

## 2020-11-11 PROCEDURE — C1753 CATH, INTRAVAS ULTRASOUND: HCPCS | Performed by: SURGERY

## 2020-11-11 PROCEDURE — 86900 BLOOD TYPING SEROLOGIC ABO: CPT

## 2020-11-11 PROCEDURE — 25010000002 ONDANSETRON PER 1 MG: Performed by: NURSE ANESTHETIST, CERTIFIED REGISTERED

## 2020-11-11 PROCEDURE — 25010000002 FENTANYL CITRATE (PF) 100 MCG/2ML SOLUTION: Performed by: NURSE ANESTHETIST, CERTIFIED REGISTERED

## 2020-11-11 PROCEDURE — 0 IODIXANOL PER 1 ML: Performed by: SURGERY

## 2020-11-11 PROCEDURE — 25010000002 NEOSTIGMINE 10 MG/10ML SOLUTION: Performed by: NURSE ANESTHETIST, CERTIFIED REGISTERED

## 2020-11-11 PROCEDURE — C1887 CATHETER, GUIDING: HCPCS | Performed by: SURGERY

## 2020-11-11 PROCEDURE — C1874 STENT, COATED/COV W/DEL SYS: HCPCS | Performed by: SURGERY

## 2020-11-11 PROCEDURE — 86901 BLOOD TYPING SEROLOGIC RH(D): CPT

## 2020-11-11 PROCEDURE — 83735 ASSAY OF MAGNESIUM: CPT | Performed by: SURGERY

## 2020-11-11 PROCEDURE — B440ZZ3 ULTRASONOGRAPHY OF ABDOMINAL AORTA, INTRAVASCULAR: ICD-10-PCS | Performed by: SURGERY

## 2020-11-11 PROCEDURE — C1766 INTRO/SHEATH,STRBLE,NON-PEEL: HCPCS | Performed by: SURGERY

## 2020-11-11 PROCEDURE — 25010000002 PROPOFOL 1000 MG/100ML EMULSION: Performed by: SURGERY

## 2020-11-11 PROCEDURE — 25010000002 HYDRALAZINE PER 20 MG: Performed by: SURGERY

## 2020-11-11 PROCEDURE — 04V03DZ RESTRICTION OF ABDOMINAL AORTA WITH INTRALUMINAL DEVICE, PERCUTANEOUS APPROACH: ICD-10-PCS | Performed by: SURGERY

## 2020-11-11 PROCEDURE — B44HZZ3 ULTRASONOGRAPHY OF BILATERAL LOWER EXTREMITY ARTERIES, INTRAVASCULAR: ICD-10-PCS | Performed by: SURGERY

## 2020-11-11 PROCEDURE — 047A3DZ DILATION OF LEFT RENAL ARTERY WITH INTRALUMINAL DEVICE, PERCUTANEOUS APPROACH: ICD-10-PCS | Performed by: SURGERY

## 2020-11-11 PROCEDURE — 25010000002 DEXAMETHASONE PER 1 MG: Performed by: NURSE ANESTHETIST, CERTIFIED REGISTERED

## 2020-11-11 PROCEDURE — 25010000002 PROPOFOL 10 MG/ML EMULSION: Performed by: NURSE ANESTHETIST, CERTIFIED REGISTERED

## 2020-11-11 PROCEDURE — 93010 ELECTROCARDIOGRAM REPORT: CPT | Performed by: INTERNAL MEDICINE

## 2020-11-11 PROCEDURE — 25010000002 HEPARIN (PORCINE) PER 1000 UNITS: Performed by: SURGERY

## 2020-11-11 PROCEDURE — C1760 CLOSURE DEV, VASC: HCPCS | Performed by: SURGERY

## 2020-11-11 PROCEDURE — 04793DZ DILATION OF RIGHT RENAL ARTERY WITH INTRALUMINAL DEVICE, PERCUTANEOUS APPROACH: ICD-10-PCS | Performed by: SURGERY

## 2020-11-11 DEVICE — IMPLANTABLE DEVICE: Type: IMPLANTABLE DEVICE | Status: FUNCTIONAL

## 2020-11-11 DEVICE — GRFT EXCLDR CONTRALAT 14.5MMX14CM: Type: IMPLANTABLE DEVICE | Status: FUNCTIONAL

## 2020-11-11 RX ORDER — HYDRALAZINE HYDROCHLORIDE 20 MG/ML
10 INJECTION INTRAMUSCULAR; INTRAVENOUS EVERY 4 HOURS PRN
Status: DISCONTINUED | OUTPATIENT
Start: 2020-11-11 | End: 2020-11-11

## 2020-11-11 RX ORDER — SODIUM CHLORIDE 0.9 % (FLUSH) 0.9 %
10 SYRINGE (ML) INJECTION AS NEEDED
Status: DISCONTINUED | OUTPATIENT
Start: 2020-11-11 | End: 2020-11-11 | Stop reason: HOSPADM

## 2020-11-11 RX ORDER — LIDOCAINE HYDROCHLORIDE 10 MG/ML
INJECTION, SOLUTION EPIDURAL; INFILTRATION; INTRACAUDAL; PERINEURAL AS NEEDED
Status: DISCONTINUED | OUTPATIENT
Start: 2020-11-11 | End: 2020-11-11 | Stop reason: SURG

## 2020-11-11 RX ORDER — ASPIRIN 81 MG/1
81 TABLET, CHEWABLE ORAL DAILY
Status: DISCONTINUED | OUTPATIENT
Start: 2020-11-11 | End: 2020-11-12 | Stop reason: HOSPADM

## 2020-11-11 RX ORDER — SODIUM CHLORIDE, SODIUM LACTATE, POTASSIUM CHLORIDE, CALCIUM CHLORIDE 600; 310; 30; 20 MG/100ML; MG/100ML; MG/100ML; MG/100ML
100 INJECTION, SOLUTION INTRAVENOUS CONTINUOUS
Status: ACTIVE | OUTPATIENT
Start: 2020-11-11 | End: 2020-11-12

## 2020-11-11 RX ORDER — ETOMIDATE 2 MG/ML
INJECTION INTRAVENOUS AS NEEDED
Status: DISCONTINUED | OUTPATIENT
Start: 2020-11-11 | End: 2020-11-11 | Stop reason: SURG

## 2020-11-11 RX ORDER — ISOSORBIDE MONONITRATE 60 MG/1
60 TABLET, EXTENDED RELEASE ORAL DAILY
Status: DISCONTINUED | OUTPATIENT
Start: 2020-11-12 | End: 2020-11-12

## 2020-11-11 RX ORDER — LIDOCAINE HYDROCHLORIDE 20 MG/ML
JELLY TOPICAL AS NEEDED
Status: DISCONTINUED | OUTPATIENT
Start: 2020-11-11 | End: 2020-11-11 | Stop reason: SURG

## 2020-11-11 RX ORDER — CLINDAMYCIN PHOSPHATE 900 MG/50ML
900 INJECTION INTRAVENOUS ONCE
Status: COMPLETED | OUTPATIENT
Start: 2020-11-11 | End: 2020-11-11

## 2020-11-11 RX ORDER — LIDOCAINE HYDROCHLORIDE 10 MG/ML
INJECTION, SOLUTION EPIDURAL; INFILTRATION; INTRACAUDAL; PERINEURAL AS NEEDED
Status: DISCONTINUED | OUTPATIENT
Start: 2020-11-11 | End: 2020-11-11 | Stop reason: HOSPADM

## 2020-11-11 RX ORDER — PROPOFOL 10 MG/ML
VIAL (ML) INTRAVENOUS AS NEEDED
Status: DISCONTINUED | OUTPATIENT
Start: 2020-11-11 | End: 2020-11-11 | Stop reason: SURG

## 2020-11-11 RX ORDER — HYDRALAZINE HYDROCHLORIDE 20 MG/ML
INJECTION INTRAMUSCULAR; INTRAVENOUS AS NEEDED
Status: DISCONTINUED | OUTPATIENT
Start: 2020-11-11 | End: 2020-11-11 | Stop reason: SURG

## 2020-11-11 RX ORDER — ROCURONIUM BROMIDE 10 MG/ML
INJECTION, SOLUTION INTRAVENOUS AS NEEDED
Status: DISCONTINUED | OUTPATIENT
Start: 2020-11-11 | End: 2020-11-11 | Stop reason: SURG

## 2020-11-11 RX ORDER — NITROGLYCERIN 20 MG/100ML
INJECTION INTRAVENOUS AS NEEDED
Status: DISCONTINUED | OUTPATIENT
Start: 2020-11-11 | End: 2020-11-11 | Stop reason: SURG

## 2020-11-11 RX ORDER — IPRATROPIUM BROMIDE AND ALBUTEROL SULFATE 2.5; .5 MG/3ML; MG/3ML
3 SOLUTION RESPIRATORY (INHALATION)
Status: DISCONTINUED | OUTPATIENT
Start: 2020-11-11 | End: 2020-11-12 | Stop reason: HOSPADM

## 2020-11-11 RX ORDER — FENTANYL CITRATE 50 UG/ML
50 INJECTION, SOLUTION INTRAMUSCULAR; INTRAVENOUS
Status: DISCONTINUED | OUTPATIENT
Start: 2020-11-11 | End: 2020-11-11 | Stop reason: HOSPADM

## 2020-11-11 RX ORDER — FAMOTIDINE 20 MG/1
20 TABLET, FILM COATED ORAL ONCE
Status: COMPLETED | OUTPATIENT
Start: 2020-11-11 | End: 2020-11-11

## 2020-11-11 RX ORDER — MAGNESIUM HYDROXIDE 1200 MG/15ML
LIQUID ORAL AS NEEDED
Status: DISCONTINUED | OUTPATIENT
Start: 2020-11-11 | End: 2020-11-11 | Stop reason: HOSPADM

## 2020-11-11 RX ORDER — HYDROMORPHONE HYDROCHLORIDE 1 MG/ML
0.5 INJECTION, SOLUTION INTRAMUSCULAR; INTRAVENOUS; SUBCUTANEOUS
Status: DISCONTINUED | OUTPATIENT
Start: 2020-11-11 | End: 2020-11-12 | Stop reason: HOSPADM

## 2020-11-11 RX ORDER — GLYCOPYRROLATE 0.2 MG/ML
INJECTION INTRAMUSCULAR; INTRAVENOUS AS NEEDED
Status: DISCONTINUED | OUTPATIENT
Start: 2020-11-11 | End: 2020-11-11 | Stop reason: SURG

## 2020-11-11 RX ORDER — HEPARIN SODIUM 1000 [USP'U]/ML
INJECTION, SOLUTION INTRAVENOUS; SUBCUTANEOUS AS NEEDED
Status: DISCONTINUED | OUTPATIENT
Start: 2020-11-11 | End: 2020-11-11 | Stop reason: SURG

## 2020-11-11 RX ORDER — GUAIFENESIN 600 MG/1
600 TABLET, EXTENDED RELEASE ORAL DAILY
Status: DISCONTINUED | OUTPATIENT
Start: 2020-11-12 | End: 2020-11-12 | Stop reason: HOSPADM

## 2020-11-11 RX ORDER — HEPARIN SODIUM 5000 [USP'U]/ML
5000 INJECTION, SOLUTION INTRAVENOUS; SUBCUTANEOUS EVERY 8 HOURS SCHEDULED
Status: DISCONTINUED | OUTPATIENT
Start: 2020-11-12 | End: 2020-11-12 | Stop reason: HOSPADM

## 2020-11-11 RX ORDER — SODIUM CHLORIDE 9 MG/ML
9 INJECTION, SOLUTION INTRAVENOUS CONTINUOUS
Status: DISCONTINUED | OUTPATIENT
Start: 2020-11-11 | End: 2020-11-12 | Stop reason: HOSPADM

## 2020-11-11 RX ORDER — ACETAMINOPHEN 500 MG
1000 TABLET ORAL EVERY 8 HOURS
Status: DISCONTINUED | OUTPATIENT
Start: 2020-11-11 | End: 2020-11-12 | Stop reason: HOSPADM

## 2020-11-11 RX ORDER — NICARDIPINE HYDROCHLORIDE 2.5 MG/ML
INJECTION INTRAVENOUS AS NEEDED
Status: DISCONTINUED | OUTPATIENT
Start: 2020-11-11 | End: 2020-11-11 | Stop reason: SURG

## 2020-11-11 RX ORDER — ONDANSETRON 4 MG/1
4 TABLET, FILM COATED ORAL EVERY 6 HOURS PRN
Status: DISCONTINUED | OUTPATIENT
Start: 2020-11-11 | End: 2020-11-12 | Stop reason: HOSPADM

## 2020-11-11 RX ORDER — HYDROMORPHONE HYDROCHLORIDE 1 MG/ML
0.5 INJECTION, SOLUTION INTRAMUSCULAR; INTRAVENOUS; SUBCUTANEOUS
Status: DISCONTINUED | OUTPATIENT
Start: 2020-11-11 | End: 2020-11-11 | Stop reason: HOSPADM

## 2020-11-11 RX ORDER — HYDRALAZINE HYDROCHLORIDE 20 MG/ML
10 INJECTION INTRAMUSCULAR; INTRAVENOUS
Status: DISCONTINUED | OUTPATIENT
Start: 2020-11-11 | End: 2020-11-12 | Stop reason: HOSPADM

## 2020-11-11 RX ORDER — NIFEDIPINE 30 MG/1
90 TABLET, EXTENDED RELEASE ORAL DAILY
Status: DISCONTINUED | OUTPATIENT
Start: 2020-11-12 | End: 2020-11-12 | Stop reason: HOSPADM

## 2020-11-11 RX ORDER — FENTANYL CITRATE 50 UG/ML
INJECTION, SOLUTION INTRAMUSCULAR; INTRAVENOUS AS NEEDED
Status: DISCONTINUED | OUTPATIENT
Start: 2020-11-11 | End: 2020-11-11 | Stop reason: SURG

## 2020-11-11 RX ORDER — NALOXONE HCL 0.4 MG/ML
0.4 VIAL (ML) INJECTION
Status: DISCONTINUED | OUTPATIENT
Start: 2020-11-11 | End: 2020-11-12 | Stop reason: HOSPADM

## 2020-11-11 RX ORDER — SODIUM CHLORIDE 0.9 % (FLUSH) 0.9 %
10 SYRINGE (ML) INJECTION EVERY 12 HOURS SCHEDULED
Status: DISCONTINUED | OUTPATIENT
Start: 2020-11-11 | End: 2020-11-11 | Stop reason: HOSPADM

## 2020-11-11 RX ORDER — ONDANSETRON 2 MG/ML
INJECTION INTRAMUSCULAR; INTRAVENOUS AS NEEDED
Status: DISCONTINUED | OUTPATIENT
Start: 2020-11-11 | End: 2020-11-11 | Stop reason: SURG

## 2020-11-11 RX ORDER — ONDANSETRON 2 MG/ML
4 INJECTION INTRAMUSCULAR; INTRAVENOUS EVERY 6 HOURS PRN
Status: DISCONTINUED | OUTPATIENT
Start: 2020-11-11 | End: 2020-11-12 | Stop reason: HOSPADM

## 2020-11-11 RX ORDER — ATORVASTATIN CALCIUM 40 MG/1
80 TABLET, FILM COATED ORAL DAILY
Status: DISCONTINUED | OUTPATIENT
Start: 2020-11-12 | End: 2020-11-12 | Stop reason: HOSPADM

## 2020-11-11 RX ORDER — LIDOCAINE HYDROCHLORIDE 10 MG/ML
0.5 INJECTION, SOLUTION EPIDURAL; INFILTRATION; INTRACAUDAL; PERINEURAL ONCE AS NEEDED
Status: COMPLETED | OUTPATIENT
Start: 2020-11-11 | End: 2020-11-11

## 2020-11-11 RX ORDER — BUDESONIDE AND FORMOTEROL FUMARATE DIHYDRATE 80; 4.5 UG/1; UG/1
2 AEROSOL RESPIRATORY (INHALATION)
Status: DISCONTINUED | OUTPATIENT
Start: 2020-11-11 | End: 2020-11-12 | Stop reason: HOSPADM

## 2020-11-11 RX ORDER — PROPOFOL 10 MG/ML
INJECTION, EMULSION INTRAVENOUS CONTINUOUS PRN
Status: COMPLETED | OUTPATIENT
Start: 2020-11-11 | End: 2020-11-11

## 2020-11-11 RX ORDER — IODIXANOL 320 MG/ML
INJECTION, SOLUTION INTRAVASCULAR AS NEEDED
Status: DISCONTINUED | OUTPATIENT
Start: 2020-11-11 | End: 2020-11-11 | Stop reason: HOSPADM

## 2020-11-11 RX ORDER — AMOXICILLIN 250 MG
2 CAPSULE ORAL 2 TIMES DAILY PRN
Status: DISCONTINUED | OUTPATIENT
Start: 2020-11-11 | End: 2020-11-12 | Stop reason: HOSPADM

## 2020-11-11 RX ORDER — HYDRALAZINE HYDROCHLORIDE 50 MG/1
50 TABLET, FILM COATED ORAL 3 TIMES DAILY
Status: DISCONTINUED | OUTPATIENT
Start: 2020-11-11 | End: 2020-11-12

## 2020-11-11 RX ORDER — NEOSTIGMINE METHYLSULFATE 1 MG/ML
INJECTION, SOLUTION INTRAVENOUS AS NEEDED
Status: DISCONTINUED | OUTPATIENT
Start: 2020-11-11 | End: 2020-11-11 | Stop reason: SURG

## 2020-11-11 RX ORDER — CLOPIDOGREL BISULFATE 75 MG/1
75 TABLET ORAL DAILY
Status: DISCONTINUED | OUTPATIENT
Start: 2020-11-12 | End: 2020-11-12 | Stop reason: HOSPADM

## 2020-11-11 RX ORDER — DEXAMETHASONE SODIUM PHOSPHATE 4 MG/ML
INJECTION, SOLUTION INTRA-ARTICULAR; INTRALESIONAL; INTRAMUSCULAR; INTRAVENOUS; SOFT TISSUE AS NEEDED
Status: DISCONTINUED | OUTPATIENT
Start: 2020-11-11 | End: 2020-11-11 | Stop reason: SURG

## 2020-11-11 RX ORDER — ATORVASTATIN CALCIUM 40 MG/1
80 TABLET, FILM COATED ORAL DAILY
Status: DISCONTINUED | OUTPATIENT
Start: 2020-11-11 | End: 2020-11-11

## 2020-11-11 RX ADMIN — ACETAMINOPHEN 1000 MG: 500 TABLET, FILM COATED ORAL at 20:29

## 2020-11-11 RX ADMIN — NICARDIPINE HYDROCHLORIDE 0.25 MG: 25 INJECTION INTRAVENOUS at 09:22

## 2020-11-11 RX ADMIN — GLYCOPYRROLATE 0.4 MG: 0.2 INJECTION INTRAMUSCULAR; INTRAVENOUS at 10:07

## 2020-11-11 RX ADMIN — HYDRALAZINE HYDROCHLORIDE 10 MG: 20 INJECTION INTRAMUSCULAR; INTRAVENOUS at 09:47

## 2020-11-11 RX ADMIN — NICARDIPINE HYDROCHLORIDE 0.5 MG: 25 INJECTION INTRAVENOUS at 09:58

## 2020-11-11 RX ADMIN — ONDANSETRON 4 MG: 2 INJECTION INTRAMUSCULAR; INTRAVENOUS at 08:12

## 2020-11-11 RX ADMIN — SODIUM CHLORIDE 9 ML/HR: 9 INJECTION, SOLUTION INTRAVENOUS at 07:24

## 2020-11-11 RX ADMIN — HYDRALAZINE HYDROCHLORIDE 10 MG: 20 INJECTION, SOLUTION INTRAMUSCULAR; INTRAVENOUS at 14:04

## 2020-11-11 RX ADMIN — NICARDIPINE HYDROCHLORIDE 0.25 MG: 25 INJECTION INTRAVENOUS at 09:35

## 2020-11-11 RX ADMIN — HEPARIN SODIUM 1000 UNITS: 1000 INJECTION, SOLUTION INTRAVENOUS; SUBCUTANEOUS at 09:29

## 2020-11-11 RX ADMIN — NICARDIPINE HYDROCHLORIDE 0.25 MG: 25 INJECTION INTRAVENOUS at 09:28

## 2020-11-11 RX ADMIN — ETOMIDATE 22 MG: 2 INJECTION, SOLUTION INTRAVENOUS at 08:03

## 2020-11-11 RX ADMIN — FENTANYL CITRATE 50 MCG: 50 INJECTION, SOLUTION INTRAMUSCULAR; INTRAVENOUS at 10:33

## 2020-11-11 RX ADMIN — PROPOFOL 50 MG: 10 INJECTION, EMULSION INTRAVENOUS at 08:48

## 2020-11-11 RX ADMIN — HYDRALAZINE HYDROCHLORIDE 10 MG: 20 INJECTION, SOLUTION INTRAMUSCULAR; INTRAVENOUS at 17:11

## 2020-11-11 RX ADMIN — ROCURONIUM BROMIDE 50 MG: 10 INJECTION INTRAVENOUS at 08:03

## 2020-11-11 RX ADMIN — IPRATROPIUM BROMIDE AND ALBUTEROL SULFATE 3 ML: 2.5; .5 SOLUTION RESPIRATORY (INHALATION) at 13:08

## 2020-11-11 RX ADMIN — HYDRALAZINE HYDROCHLORIDE 50 MG: 50 TABLET, FILM COATED ORAL at 15:01

## 2020-11-11 RX ADMIN — NICARDIPINE HYDROCHLORIDE 0.25 MG: 25 INJECTION INTRAVENOUS at 09:29

## 2020-11-11 RX ADMIN — PROPOFOL 50 MG: 10 INJECTION, EMULSION INTRAVENOUS at 08:06

## 2020-11-11 RX ADMIN — NITROGLYCERIN 80 MCG: 20 INJECTION INTRAVENOUS at 08:49

## 2020-11-11 RX ADMIN — NICARDIPINE HYDROCHLORIDE 0.25 MG: 25 INJECTION INTRAVENOUS at 09:53

## 2020-11-11 RX ADMIN — LIDOCAINE HYDROCHLORIDE 50 MG: 10 INJECTION, SOLUTION EPIDURAL; INFILTRATION; INTRACAUDAL; PERINEURAL at 08:03

## 2020-11-11 RX ADMIN — DEXAMETHASONE SODIUM PHOSPHATE 4 MG: 4 INJECTION, SOLUTION INTRAMUSCULAR; INTRAVENOUS at 08:12

## 2020-11-11 RX ADMIN — NICARDIPINE HYDROCHLORIDE 0.25 MG: 25 INJECTION INTRAVENOUS at 09:32

## 2020-11-11 RX ADMIN — NICARDIPINE HYDROCHLORIDE 0.25 MG: 25 INJECTION INTRAVENOUS at 09:24

## 2020-11-11 RX ADMIN — ASPIRIN 81 MG: 81 TABLET, CHEWABLE ORAL at 12:26

## 2020-11-11 RX ADMIN — HYDRALAZINE HYDROCHLORIDE 10 MG: 20 INJECTION, SOLUTION INTRAMUSCULAR; INTRAVENOUS at 18:28

## 2020-11-11 RX ADMIN — IPRATROPIUM BROMIDE AND ALBUTEROL SULFATE 3 ML: 2.5; .5 SOLUTION RESPIRATORY (INHALATION) at 18:35

## 2020-11-11 RX ADMIN — NICARDIPINE HYDROCHLORIDE 0.25 MG: 25 INJECTION INTRAVENOUS at 09:40

## 2020-11-11 RX ADMIN — LIDOCAINE HYDROCHLORIDE 0.2 ML: 10 INJECTION, SOLUTION EPIDURAL; INFILTRATION; INTRACAUDAL; PERINEURAL at 07:24

## 2020-11-11 RX ADMIN — LIDOCAINE HYDROCHLORIDE 2 ML: 20 JELLY TOPICAL at 08:05

## 2020-11-11 RX ADMIN — NICARDIPINE HYDROCHLORIDE 5 MG/HR: 0.1 INJECTION, SOLUTION INTRAVENOUS at 09:49

## 2020-11-11 RX ADMIN — ACETAMINOPHEN 1000 MG: 500 TABLET, FILM COATED ORAL at 12:30

## 2020-11-11 RX ADMIN — NICARDIPINE HYDROCHLORIDE 5 MG/HR: 0.1 INJECTION, SOLUTION INTRAVENOUS at 12:30

## 2020-11-11 RX ADMIN — SODIUM CHLORIDE, POTASSIUM CHLORIDE, SODIUM LACTATE AND CALCIUM CHLORIDE 100 ML/HR: 600; 310; 30; 20 INJECTION, SOLUTION INTRAVENOUS at 12:22

## 2020-11-11 RX ADMIN — NICARDIPINE HYDROCHLORIDE 0.25 MG: 25 INJECTION INTRAVENOUS at 09:45

## 2020-11-11 RX ADMIN — BUDESONIDE AND FORMOTEROL FUMARATE DIHYDRATE 2 PUFF: 80; 4.5 AEROSOL RESPIRATORY (INHALATION) at 18:36

## 2020-11-11 RX ADMIN — HYDRALAZINE HYDROCHLORIDE 5 MG: 20 INJECTION INTRAMUSCULAR; INTRAVENOUS at 09:43

## 2020-11-11 RX ADMIN — HYDRALAZINE HYDROCHLORIDE 5 MG: 20 INJECTION INTRAMUSCULAR; INTRAVENOUS at 09:35

## 2020-11-11 RX ADMIN — FENTANYL CITRATE 50 MCG: 50 INJECTION, SOLUTION INTRAMUSCULAR; INTRAVENOUS at 08:03

## 2020-11-11 RX ADMIN — FAMOTIDINE 20 MG: 20 TABLET, FILM COATED ORAL at 07:23

## 2020-11-11 RX ADMIN — CLINDAMYCIN PHOSPHATE 900 MG: 18 INJECTION, SOLUTION INTRAVENOUS at 08:15

## 2020-11-11 RX ADMIN — HEPARIN SODIUM 8000 UNITS: 1000 INJECTION, SOLUTION INTRAVENOUS; SUBCUTANEOUS at 08:31

## 2020-11-11 RX ADMIN — HYDRALAZINE HYDROCHLORIDE 50 MG: 50 TABLET, FILM COATED ORAL at 20:29

## 2020-11-11 RX ADMIN — SODIUM CHLORIDE, POTASSIUM CHLORIDE, SODIUM LACTATE AND CALCIUM CHLORIDE 100 ML/HR: 600; 310; 30; 20 INJECTION, SOLUTION INTRAVENOUS at 21:47

## 2020-11-11 RX ADMIN — HYDROMORPHONE HYDROCHLORIDE 0.5 MG: 1 INJECTION, SOLUTION INTRAMUSCULAR; INTRAVENOUS; SUBCUTANEOUS at 19:53

## 2020-11-11 RX ADMIN — NEOSTIGMINE 2.5 MG: 1 INJECTION INTRAVENOUS at 10:07

## 2020-11-11 RX ADMIN — NICARDIPINE HYDROCHLORIDE 0.25 MG: 25 INJECTION INTRAVENOUS at 09:47

## 2020-11-11 NOTE — ANESTHESIA POSTPROCEDURE EVALUATION
Patient: Rony He    Procedure Summary     Date: 11/11/20 Room / Location:  [facility] /  [facility]    Anesthesia Start: 0757 Anesthesia Stop: 1032    Procedure: ENDOVASCULAR AORTIC ANEURSYSM REPAIR WITH BILATERAL RENAL ARTERY STENTS (N/A ) Diagnosis:     Surgeon: Anurag Crook MD Provider:     Anesthesia Type: general ASA Status: 4          Anesthesia Type: general    Vitals  Vitals Value Taken Time   /64 11/11/20 1029   Temp     Pulse 54 11/11/20 1031   Resp     SpO2 89 % 11/11/20 1031   Vitals shown include unvalidated device data.        Post Anesthesia Care and Evaluation    Patient location during evaluation: PACU  Patient participation: complete - patient participated  Level of consciousness: awake and alert  Pain management: adequate  Airway patency: patent  Anesthetic complications: No anesthetic complications  PONV Status: none  Cardiovascular status: hemodynamically stable and acceptable  Respiratory status: nonlabored ventilation, acceptable and nasal cannula  Hydration status: acceptable

## 2020-11-11 NOTE — PROGRESS NOTES
INTENSIVIST   PROGRESS NOTE     Hospital:  LOS: 0 days      S     Mr. Rony He, 65 y.o. male is followed for:      5.3cm infrarenal AAA s/p endovascular repair     Renal artery stenosis s/p bilateral stent placement     HFrEF 55%    CAD s/p stents     On home O2 (2L NC)     As an Intensivist, we provide an integrated approach to the ICU patient and family, medical management of comorbid conditions, including but not limited to electrolytes, glycemic control, organ dysfunction, lead interdisciplinary rounds and coordinate the care with all other services, including those from other specialists.     Interval History:  POD: Day of Surgery    Rony He is a 65 y.o. male that presents to [facility] on 11/11 for scheduled endovascular AAA repair per Dr. Crook.     The patient was known to have an infrarenal AAA who recently underwent renal angiogram for evaluation of CKD and renal artery stenosis noted to have enlargement of his AAA to 5.3cm.     He was evaluated by Dr. Crook who recommended proceeding with surgical repair.     SARS-CoV-2 testing on 11/10 negative.     On 11/11 Dr. Crook performed endovascular AAA repair with a [place] Excluder main body 35x14.5x18 on the left side and [place] Excluder contralateral limb extension 16x14.5 on the right side as well as bilateral renal artery stent placement. EBL 50mL.     Post-operatively he was transferred to 2A ICU in stable condition.     I saw him upon arrival from PACU. On a ventimask. Cardene intravenous infusion.  No respiratory distress.    Temp  Min: 97.5 °F (36.4 °C)  Max: 98.3 °F (36.8 °C)     PMH: He  has a past medical history of Angina at rest (CMS/[facility]), CHF (congestive heart failure) (CMS/[facility]), Chronic kidney disease, stage 3, COPD (chronic obstructive pulmonary disease) (CMS/[facility]), Dyslipidemia, Heart failure (CMS/HCC), Heart murmur, Hyperlipidemia, Hypertension, Kidney stone, Myocardial infarction (CMS/[facility]), On home oxygen  therapy, Probable coronary artery disease, Rheumatic fever, Unstable angina (CMS/HCC), Wears dentures, and Wears glasses.   PSxH: He  has a past surgical history that includes Hernia repair; Appendectomy; Other surgical history; Other surgical history; Cardiac catheterization; Cardiac catheterization (N/A, 2/26/2019); and Coronary angioplasty with stent.      Medications:  No current facility-administered medications on file prior to encounter.      Current Outpatient Medications on File Prior to Encounter   Medication Sig   • aspirin 81 MG chewable tablet Chew 1 tablet Daily.   • atorvastatin (LIPITOR) 80 MG tablet Take 1 tablet by mouth Daily.   • clopidogrel (PLAVIX) 75 MG tablet Take 1 tablet by mouth Daily. (Patient taking differently: Take 75 mg by mouth Daily. Was instructed by Dr. ANGULO to continue)   • isosorbide mononitrate (IMDUR) 60 MG 24 hr tablet TAKE 1 TABLET BY MOUTH ONCE DAILY (Patient taking differently: Take  by mouth 2 (two) times a day. 120 mg in the morning and 90 mg at 15:00 in the afternoon)       Allergies: He is allergic to amlodipine; carvedilol; and penicillins.   FH: His family history includes Cancer in his mother; Diabetes in his father; Heart attack in his father; Hyperlipidemia in his father; Hypertension in his father.   SH: He  reports that he quit smoking about 10 months ago. His smoking use included cigarettes. He has a 66.00 pack-year smoking history. He has never used smokeless tobacco. He reports that he does not drink alcohol or use drugs.       The patient's relevant past medical, surgical and social history were reviewed and updated in Epic as appropriate.     ROS:  As described in the HPI. Other systems reviewed and no contributory to current problem.       O     Vitals:  Temp: 98.3 °F (36.8 °C) (11/11/20 1130) Temp  Min: 97.5 °F (36.4 °C)  Max: 98.3 °F (36.8 °C)   Temp core:      BP: 126/67 (11/11/20 1130) BP  Min: 122/62  Max: 165/80   Pulse: 52 (11/11/20 1130) Pulse  Min:  51  Max: 57   Resp: 16 (11/11/20 1130) Resp  Min: 14  Max: 16   SpO2: 99 % (11/11/20 1130) SpO2  Min: 90 %  Max: 99 %   Device: Venturi mask system (11/11/20 1130)    Flow Rate: 4 (11/11/20 1040) Flow (L/min)  Min: 4  Max: 6     Intake/Ouptut 24 hrs (7:00AM - 6:59 AM)  Intake & Output (last 3 days)       11/08 0701 - 11/09 0700 11/09 0701 - 11/10 0700 11/10 0701 - 11/11 0700 11/11 0701 - 11/12 0700    I.V. (mL/kg)    700 (7.8)    Total Intake(mL/kg)    700 (7.8)    Blood    50    Total Output    50    Net    +650                  Medications (drips):  niCARdipine, Last Rate: 10 mg/hr (11/11/20 0954)  sodium chloride, Last Rate: 9 mL/hr (11/11/20 0724)      Physical Examination  Telemetry:  Rhythm: normal sinus rhythm, sinus bradycardia (11/11/20 1130)         Constitutional:  No acute distress.   Cardiovascular: RRR.   Normal heart sounds.  No murmurs, gallop or rub.   Respiratory: Normal breath sounds  No adventitious sounds.   Abdominal:  Soft with no tenderness.  No distension.   No HSM.   Extremities: Warm.  Dry.  No cyanosis.  No Edema   Neurological:   Alert, Oriented, Cooperative.  Best Eye Response: 4-->(E4) spontaneous (11/11/20 1130)  Best Motor Response: 6-->(M6) obeys commands (11/11/20 1130)  Best Verbal Response: 5-->(V5) oriented (11/11/20 1130)  [place] Coma Scale Score: 15 (11/11/20 1130)   L/D/A: Peripheral IV(s)  R radial  Arterial Line        Hematology:  Results from last 7 days   Lab Units 11/10/20  1710   WBC 10*3/mm3 9.77   HEMOGLOBIN g/dL 10.6*   MCV fL 86.2   PLATELETS 10*3/mm3 249     Chemistry:  Estimated Creatinine Clearance: 42.1 mL/min (A) (by C-G formula based on SCr of 1.95 mg/dL (H)).  Results from last 7 days   Lab Units 11/10/20  1710   SODIUM mmol/L 141   POTASSIUM mmol/L 4.6   CHLORIDE mmol/L 104   CO2 mmol/L 25.0   BUN mg/dL 39*   CREATININE mg/dL 1.95*   GLUCOSE mg/dL 108*     Results from last 7 days   Lab Units 11/10/20  1710   CALCIUM mg/dL 8.9     COVID-19  Lab Results    Lab Value Date/Time    COVID19 Not Detected 11/10/2020 1710     Images:  Xr Chest Pa & Lateral    Result Date: 11/10/2020  Stable chest exam with hyperinflated lungs and borderline heart shadow enlargement. No acute chest disease is identified.   D:  11/10/2020 E:  11/10/2020         Echo:  Results for orders placed during the hospital encounter of 19   Adult Transthoracic Echo Complete W/ Cont if Necessary Per Protocol    Narrative · Estimated EF = 55%.  · Left ventricular wall thickness is consistent with mild-to-moderate   concentric hypertrophy.  · Left ventricular diastolic dysfunction (grade I) consistent with   impaired relaxation.  · Mild aortic valve regurgitation is present.  · Mild aortic valve stenosis is present.  · Aortic valve mean pressure gradient is 14.4 mmHg.        Results: Reviewed.  I reviewed the patient's new laboratory and imaging results.  I independently reviewed the patient's new images.    Medications: Reviewed.    Assessment/Plan   A / P     Assessment:    65 y.o.male, admitted on 2020 with Aortic aneurysm without rupture (CMS/HCC) [I71.9]:     1. 5.3 cm infrarenal AAA   Procedure(s) (LRB):  ENDOVASCULAR AORTIC ANEURSYSM REPAIR WITH BILATERAL RENAL ARTERY STENTS (N/A)  Dr. Crook  20     2. Stage III CKD   1. Bilateral MICHAEL s/p bilateral stenting on 2020 per Dr. Crook  2. Baseline sCr 1.5-1.9   3. Cardiac   1. CAD s/p stents  2. HFrEF 55%   3. HTN w/prior admission for HTN urgency   4. Dyslipidemia on HD statin   4. Former smoker   5. Home O2 use   6. Antibiotics: Clindamycin pre-op   7. Glucose: At risk for DM (pre-diabetes) based on his HbA1c. [HbA1C 5.7%-6.4%, eA-139 mg/dL].         Lab Results   Lab Value Date/Time    HGBA1C 5.80 (H) 11/10/2020 1710    HGBA1C 5.70 (H) 2019 0619       Nutrition Support: Patient isn't on Tube Feeding   Modulars: Patient doesn't have any tube feeding modular orders   Diet: No diet orders on file    Advance Directives: There are no questions and answers to display.        Plan:    1. ICU post operative medical management.  2. Hemodynamic management. Adequate preload.  3. Urinary Output > = 0.5 mL/kg/hr  4. Watch for arrhythmias.  5. Watch for bleeding.  6. Disposition: Keep in ICU.    Plan of care and goals reviewed during interdisciplinary rounds.  I discussed the patient's findings and my recommendations with patient    Level of Risk is High due to:  illness with threat to life or bodily function.     JOE Graves, AGACNP-BC, FNP-BC   Pulmonary and Critical Care     I performed an independent history and physical examination. Portions of the history were obtained by JOE Graves, AGAGIANFRANCOP-BC   and were modified by me according to my findings. The above note reflects my findings, assessment, and plan.      Pipe Aquino MD, FACP, FCCP, [facility]  Intensive Care Medicine, Nutrition Support and Pulmonary Medicine     []  Primary Attending  [x]  Consultant

## 2020-11-11 NOTE — ANESTHESIA PROCEDURE NOTES
Airway  Urgency: elective    Date/Time: 11/11/2020 8:05 AM  Airway not difficult    General Information and Staff    Patient location during procedure: OR  CRNA: Job Álvarez III, CRNA    Indications and Patient Condition  Indications for airway management: airway protection    Preoxygenated: yes  MILS not maintained throughout  Mask difficulty assessment: 2 - vent by mask + OA or adjuvant +/- NMBA    Final Airway Details  Final airway type: endotracheal airway      Successful airway: ETT  Cuffed: yes   Successful intubation technique: direct laryngoscopy  Blade: Rosalio  Blade size: 3  ETT size (mm): 7.5  Cormack-Lehane Classification: grade IIa - partial view of glottis  Placement verified by: chest auscultation and capnometry   Measured from: lips  ETT/EBT  to lips (cm): 21  Number of attempts at approach: 1  Assessment: lips, teeth, and gum same as pre-op and atraumatic intubation    Additional Comments  Negative epigastric sounds, Breath sound equal bilaterally with symmetric chest rise and fall.

## 2020-11-11 NOTE — PLAN OF CARE
Goal Outcome Evaluation:  Plan of Care Reviewed With: patient  Progress: improving  Outcome Summary: Pt is A/O x4 on room air. Bilateral femoral sites are soft/pulses palpable, no issues with bleeding. Pt arrived to the ICU on cardene gtt. Pt BP was elevated and pt was bradycardic <50's. Per MD to d/c cardene gtt and admintster PRN hyrdalazine to keep SBP <180. BP is now stable. Pt also was not able to urinate spontaneously. I/O cath approx 600ml and per MD if issues persist, to anchor a F/C. HOB has remained <30 degrees.

## 2020-11-11 NOTE — OP NOTE
PROCEDURE NOT FOUND  Procedure Report    Patient Name:  Rony He  YOB: 1955    Date of Surgery:  11/11/2020     Indications:  Asymptomatic Infrarenal 5.3 cm Aortic Aneurysm    Pre-op Diagnosis:   1. Asymptomatic Infrarenal 5.3 cm Aortic Aneurysm  2. Renal insufficiency with severe renal artery stenosis    Post-Op Diagnosis Codes:   Asymptomatic Infrarenal 5.3 cm Aortic Aneurysm  2. Renal insufficiency with severe renal artery stenosis    Procedure/CPT® Codes:      Procedure(s):  1.  Lateral common femoral artery percutaneous ultrasound-guided access (16 Fr right, 18 Fr left)  2.  Aortogram with radiologic interpretation  3.  Intravascular ultrasound of abdominal aorta, right common and external iliac arteries (8 Fr [place] catheter)  4.  Percutaneous endovascular repair of infrarenal abdominal aortic aneurysm       A) [place] Excluder main body 35x14.5x18 delivered via left side       B) [place] Excluder contralateral limb extension 16x14.5 delivered via right side  5. Bilateral renal artery stent placement 6x29 mm VBX    Staff:  Surgeon(s):  Anurag Crook MD Nick Abedi MD    Circulator: Julia Witt RN  Radiology Technologist: Cody Deras Jessica A RT  Scrub Person: Bobby Bryant  Vendor Representative: Eric Isbell           Anesthesia: General    Estimated Blood Loss: 50 mL    Implants:    Implant Name Type Inv. Item Serial No.  Lot No. LRB No. Used Action   GRFT EXCLDR C3 TRNK I/LAT [id number] - [id number] - [id number] Implant GRFT EXCLDR C3 TRNK I/LAT [id number] [employer] N/A  1 Implanted   GRFT EXCLDR CONTRALAT [id number] - [id number] - [id number] Implant GRFT EXCLDR CONTRALAT [id number] [employer] N/A  1 Implanted   STENTGR ENDOPROSTH VIABAHN VBX EXP 7F [id number] 80CM - [id number] - [id number] Implant STENTGR ENDOPROSTH VIABAHN VBX EXP 7F [id number] 80CM [id number] [employer] N/A  1 Implanted   STENTGR  Pt presents to ED via EMS from home with c/o fall with pain to right hip. Right lower extremity shortening with external rotation. Pain rated 10/10 upon movement. + distal pulses noted. ENDOPROSTH VIABAHN VBX EXP 7F [id number] 80CM - [id number] - [id number] Implant STENTGR ENDOPROSTH VIABAHN VBX EXP 7F [id number] 80CM [id number] [employer]  N/A  1 Implanted       Specimen:          none        Findings: Successful endovascular repair of infrarenal aortic aneurysm without signs of an endoleak.  Successful stenting of bilateral renal arteries.    Complications: None    Description of Procedure:   Patient was brought to the operating room and laid on the operating room table in the supine position.  Patient underwent successful induction of general anesthesia.  Patient's abdomen and bilateral lower extremities were prepped and draped in standard surgical fashion.  Perioperative biotics were administered.  Final timeout was performed.  Bilateral common femoral arteries were identified with ultrasound guidance.  Skin subcutaneous tissue was infiltrated with local anesthetic.  Arteries were accessed utilizing micropuncture technique and bilateral 6 Spanish sheaths were used to predilate common femoral arteries.  2 Perclose devices were placed into each vessel and 8 Spanish sheaths were placed.  Heparin was administered.  Lunderquist wires were advanced into the thoracic aorta.  An 8 Spanish intravascular ultrasound was used to obtain measurements of the right external and common iliac arteries.  This also allowed us to visualize location of the perivisceral vessels.  After this was done a 16 Spanish sheath was advanced on the right side and an 18 Spanish sheath was advanced on the left side.  The preselected main body device was advanced and positioned in the perivisceral aorta.  Flush catheter was advanced up the right side and positioned above the device.  An aortogram was performed.  This showed patent celiac and superior mesenteric arteries.  This showed an occlusion of the left renal artery and severe greater than 70% stenosis of the right renal artery.  At this point in time the main body of the  endograft device was pulled down below the ostium of the left renal artery, and then device was deployed until the contralateral gate opened.  The contralateral gate was cannulated with a glide wire and a flush catheter.  After this intravascular ultrasound was advanced over the wire to confirm correct cannulation.  The wire was exchanged for a Lunderquist wire.  The C arm was repositioned into the [place] position with caudal angulation.  Contrast injection was performed through the right sided sheath to identify the location of the right common iliac artery bifurcation.  The contralateral limb was then advanced up the right side and deployed with excellent overlap.  Attention was turned to the left side.  The C-arm was repositioned into the DHILLON position with caudal angulation.  The sheath was pulled back and contrast injection showed the location of the left common iliac artery bifurcation.  The rest of the device was deployed and the delivery system was removed.  After this a [place] molding balloon was advanced up each side and carefully angioplastied proximal, overlap and distal areas of the device.  We then turned our attention to cannulation of the renal arteries.  The right renal artery was cannulated utilizing in the [place] steerable sheath.  This confirmed ostial stenosis.  The renal artery was stented with a 6 x 29 mm VBX stent graft, with excellent angiographic result.  The occluded left renal artery was then cannulated and intraluminal reentry was confirmed.  The left renal artery was stented with a 6 x 29 mm VBX stent graft with excellent angiographic result.  After this, completion aortogram was performed.  Showed patent renal artery flow with patent bilateral internal iliac arteries, excellent apposition of the device, and no signs of an endoleak.  At this point in time, heparin effect was reversed with protamine.  The sheaths were removed and Perclose devices were tightened and locked.  Excellent  hemostasis was achieved.  There was no hematomas.  Access sites were closed with Dermabond.  Patient was awakened, extubated, transferred to a stretcher and taken to recovery in stable condition.  All counts were correct.      Anurag Crook MD     Date: 11/11/2020  Time: 10:26 EST

## 2020-11-11 NOTE — ANESTHESIA PREPROCEDURE EVALUATION
Anesthesia Evaluation                  Airway   Mallampati: I  TM distance: >3 FB  Neck ROM: full  No difficulty expected  Dental - normal exam     Pulmonary - normal exam   (+) a smoker Former, COPD, home oxygen,   Cardiovascular - normal exam    (+) hypertension, valvular problems/murmurs (EF 55%, MILD AI/AS BY ECHO LAST YEAR) murmur, past MI , CAD, cardiac stents dysrhythmias (PVCS) Bradycardia, angina, CHF , hyperlipidemia,     ROS comment: EF 25% AT TIME OF [facility] AND STENT 2/19  FOLLOWED BY DR MUELLER IN [place], [place]    Neuro/Psych  GI/Hepatic/Renal/Endo    (+)   renal disease CRI,     Musculoskeletal     Abdominal  - normal exam    Bowel sounds: normal.   Substance History      OB/GYN          Other                      Anesthesia Plan    ASA 4     general   (JOSETTE)  intravenous induction     Anesthetic plan, all risks, benefits, and alternatives have been provided, discussed and informed consent has been obtained with: patient.    Plan discussed with CRNA.

## 2020-11-12 VITALS
DIASTOLIC BLOOD PRESSURE: 67 MMHG | HEIGHT: 70 IN | SYSTOLIC BLOOD PRESSURE: 136 MMHG | BODY MASS INDEX: 29.67 KG/M2 | HEART RATE: 68 BPM | RESPIRATION RATE: 20 BRPM | OXYGEN SATURATION: 96 % | TEMPERATURE: 99 F | WEIGHT: 207.23 LBS

## 2020-11-12 LAB
ANION GAP SERPL CALCULATED.3IONS-SCNC: 10 MMOL/L (ref 5–15)
BASOPHILS # BLD AUTO: 0.03 10*3/MM3 (ref 0–0.2)
BASOPHILS NFR BLD AUTO: 0.3 % (ref 0–1.5)
BH BB BLOOD EXPIRATION DATE: NORMAL
BH BB BLOOD EXPIRATION DATE: NORMAL
BH BB BLOOD TYPE BARCODE: 5100
BH BB BLOOD TYPE BARCODE: 5100
BH BB DISPENSE STATUS: NORMAL
BH BB DISPENSE STATUS: NORMAL
BH BB PRODUCT CODE: NORMAL
BH BB PRODUCT CODE: NORMAL
BH BB UNIT NUMBER: NORMAL
BH BB UNIT NUMBER: NORMAL
BUN SERPL-MCNC: 33 MG/DL (ref 8–23)
BUN/CREAT SERPL: 15.7 (ref 7–25)
CALCIUM SPEC-SCNC: 8.4 MG/DL (ref 8.6–10.5)
CHLORIDE SERPL-SCNC: 109 MMOL/L (ref 98–107)
CO2 SERPL-SCNC: 22 MMOL/L (ref 22–29)
CREAT SERPL-MCNC: 2.1 MG/DL (ref 0.76–1.27)
CROSSMATCH INTERPRETATION: NORMAL
CROSSMATCH INTERPRETATION: NORMAL
DEPRECATED RDW RBC AUTO: 49.1 FL (ref 37–54)
EOSINOPHIL # BLD AUTO: 0.03 10*3/MM3 (ref 0–0.4)
EOSINOPHIL NFR BLD AUTO: 0.3 % (ref 0.3–6.2)
ERYTHROCYTE [DISTWIDTH] IN BLOOD BY AUTOMATED COUNT: 15.3 % (ref 12.3–15.4)
GFR SERPL CREATININE-BSD FRML MDRD: 32 ML/MIN/1.73
GLUCOSE SERPL-MCNC: 126 MG/DL (ref 65–99)
HCT VFR BLD AUTO: 29.1 % (ref 37.5–51)
HGB BLD-MCNC: 9 G/DL (ref 13–17.7)
IMM GRANULOCYTES # BLD AUTO: 0.04 10*3/MM3 (ref 0–0.05)
IMM GRANULOCYTES NFR BLD AUTO: 0.4 % (ref 0–0.5)
LYMPHOCYTES # BLD AUTO: 1.03 10*3/MM3 (ref 0.7–3.1)
LYMPHOCYTES NFR BLD AUTO: 9.4 % (ref 19.6–45.3)
MCH RBC QN AUTO: 27.1 PG (ref 26.6–33)
MCHC RBC AUTO-ENTMCNC: 30.9 G/DL (ref 31.5–35.7)
MCV RBC AUTO: 87.7 FL (ref 79–97)
MONOCYTES # BLD AUTO: 1.02 10*3/MM3 (ref 0.1–0.9)
MONOCYTES NFR BLD AUTO: 9.3 % (ref 5–12)
NEUTROPHILS NFR BLD AUTO: 8.81 10*3/MM3 (ref 1.7–7)
NEUTROPHILS NFR BLD AUTO: 80.3 % (ref 42.7–76)
NRBC BLD AUTO-RTO: 0 /100 WBC (ref 0–0.2)
PLATELET # BLD AUTO: 211 10*3/MM3 (ref 140–450)
PMV BLD AUTO: 12.4 FL (ref 6–12)
POTASSIUM SERPL-SCNC: 4.2 MMOL/L (ref 3.5–5.2)
RBC # BLD AUTO: 3.32 10*6/MM3 (ref 4.14–5.8)
SODIUM SERPL-SCNC: 141 MMOL/L (ref 136–145)
UNIT  ABO: NORMAL
UNIT  ABO: NORMAL
UNIT  RH: NORMAL
UNIT  RH: NORMAL
WBC # BLD AUTO: 10.96 10*3/MM3 (ref 3.4–10.8)

## 2020-11-12 PROCEDURE — 85025 COMPLETE CBC W/AUTO DIFF WBC: CPT | Performed by: INTERNAL MEDICINE

## 2020-11-12 PROCEDURE — 94799 UNLISTED PULMONARY SVC/PX: CPT

## 2020-11-12 PROCEDURE — 80048 BASIC METABOLIC PNL TOTAL CA: CPT | Performed by: SURGERY

## 2020-11-12 PROCEDURE — 25010000002 HEPARIN (PORCINE) PER 1000 UNITS: Performed by: SURGERY

## 2020-11-12 RX ORDER — HYDRALAZINE HYDROCHLORIDE 50 MG/1
50 TABLET, FILM COATED ORAL ONCE
Status: COMPLETED | OUTPATIENT
Start: 2020-11-12 | End: 2020-11-12

## 2020-11-12 RX ORDER — ISOSORBIDE MONONITRATE 60 MG/1
60 TABLET, EXTENDED RELEASE ORAL ONCE
Status: COMPLETED | OUTPATIENT
Start: 2020-11-12 | End: 2020-11-12

## 2020-11-12 RX ORDER — OXYCODONE HYDROCHLORIDE 5 MG/1
5 TABLET ORAL EVERY 6 HOURS PRN
Qty: 20 TABLET | Refills: 0
Start: 2020-11-12 | End: 2020-11-17

## 2020-11-12 RX ORDER — ISOSORBIDE MONONITRATE 60 MG/1
120 TABLET, EXTENDED RELEASE ORAL DAILY
Status: DISCONTINUED | OUTPATIENT
Start: 2020-11-13 | End: 2020-11-12 | Stop reason: HOSPADM

## 2020-11-12 RX ORDER — ISOSORBIDE DINITRATE 30 MG/1
90 TABLET ORAL EVERY EVENING
Qty: 90 TABLET | Refills: 0
Start: 2020-11-12

## 2020-11-12 RX ORDER — HYDRALAZINE HYDROCHLORIDE 50 MG/1
100 TABLET, FILM COATED ORAL 3 TIMES DAILY
Status: DISCONTINUED | OUTPATIENT
Start: 2020-11-12 | End: 2020-11-12 | Stop reason: HOSPADM

## 2020-11-12 RX ORDER — ISOSORBIDE MONONITRATE 60 MG/1
120 TABLET, EXTENDED RELEASE ORAL EVERY MORNING
Qty: 60 TABLET | Refills: 0 | Status: SHIPPED | OUTPATIENT
Start: 2020-11-12

## 2020-11-12 RX ADMIN — CLOPIDOGREL BISULFATE 75 MG: 75 TABLET ORAL at 08:59

## 2020-11-12 RX ADMIN — ATORVASTATIN CALCIUM 80 MG: 40 TABLET, FILM COATED ORAL at 08:58

## 2020-11-12 RX ADMIN — ACETAMINOPHEN 1000 MG: 500 TABLET, FILM COATED ORAL at 05:01

## 2020-11-12 RX ADMIN — ASPIRIN 81 MG: 81 TABLET, CHEWABLE ORAL at 08:58

## 2020-11-12 RX ADMIN — IPRATROPIUM BROMIDE AND ALBUTEROL SULFATE 3 ML: 2.5; .5 SOLUTION RESPIRATORY (INHALATION) at 07:59

## 2020-11-12 RX ADMIN — GUAIFENESIN 600 MG: 600 TABLET, EXTENDED RELEASE ORAL at 08:58

## 2020-11-12 RX ADMIN — HYDRALAZINE HYDROCHLORIDE 50 MG: 50 TABLET, FILM COATED ORAL at 08:58

## 2020-11-12 RX ADMIN — HEPARIN SODIUM 5000 UNITS: 5000 INJECTION INTRAVENOUS; SUBCUTANEOUS at 14:28

## 2020-11-12 RX ADMIN — HEPARIN SODIUM 5000 UNITS: 5000 INJECTION INTRAVENOUS; SUBCUTANEOUS at 05:01

## 2020-11-12 RX ADMIN — METOPROLOL SUCCINATE 150 MG: 100 TABLET, EXTENDED RELEASE ORAL at 08:58

## 2020-11-12 RX ADMIN — HYDRALAZINE HYDROCHLORIDE 50 MG: 50 TABLET, FILM COATED ORAL at 11:35

## 2020-11-12 RX ADMIN — NICARDIPINE HYDROCHLORIDE 7.5 MG/HR: 0.1 INJECTION, SOLUTION INTRAVENOUS at 09:16

## 2020-11-12 RX ADMIN — NICARDIPINE HYDROCHLORIDE 7.5 MG/HR: 0.1 INJECTION, SOLUTION INTRAVENOUS at 11:05

## 2020-11-12 RX ADMIN — ISOSORBIDE MONONITRATE 60 MG: 60 TABLET, EXTENDED RELEASE ORAL at 08:58

## 2020-11-12 RX ADMIN — NIFEDIPINE 90 MG: 30 TABLET, FILM COATED, EXTENDED RELEASE ORAL at 08:57

## 2020-11-12 RX ADMIN — ISOSORBIDE MONONITRATE 60 MG: 60 TABLET, EXTENDED RELEASE ORAL at 11:35

## 2020-11-12 RX ADMIN — BUDESONIDE AND FORMOTEROL FUMARATE DIHYDRATE 2 PUFF: 80; 4.5 AEROSOL RESPIRATORY (INHALATION) at 07:59

## 2020-11-12 NOTE — DISCHARGE SUMMARY
Date of Discharge:  11/12/2020    Discharge Diagnosis:   1. Asymptomatic Infrarenal 5.3 cm Aortic Aneurysm  2. Renal insufficiency with severe renal artery stenosis    Presenting Problem/History of Present Illness  Active Hospital Problems    Diagnosis  POA   • **5.3cm infrarenal AAA s/p endovascular repair  [I71.9]  Yes   • Renal artery stenosis s/p bilateral stent placement  [I70.1]  Yes   • Stage III CKD  [N18.30]  Yes   • Former smoker [Z87.891]  Not Applicable   • On home O2 (2L NC)  [Z99.81]  Not Applicable   • CAD s/p stents  [I25.10]  Yes   • HFrEF 55% [I50.22]  Yes   • Dyslipidemia [E78.5]  Yes   • Hypertension [I10]  Yes      Resolved Hospital Problems   No resolved problems to display.          Hospital Course  Patient is a 65 y.o. male presented with asymptomatic Infrarenal 5.3 cm Aortic Aneurysm and renal insufficiency with severe renal artery stenosis. He was admitted under the services of Dr. Anurag Crook. After informed consent was given, the patient was taken to the operating room where he underwent:  1.  Lateral common femoral artery percutaneous ultrasound-guided access (16 Fr right, 18 Fr left)  2.  Aortogram with radiologic interpretation  3.  Intravascular ultrasound of abdominal aorta, right common and external iliac arteries (8 Fr [place] catheter)  4.  Percutaneous endovascular repair of infrarenal abdominal aortic aneurysm       A) [place] Excluder main body 35x14.5x18 delivered via left side       B) [place] Excluder contralateral limb extension 16x14.5 delivered via right side  5. Bilateral renal artery stent placement 6x29 mm VBX  Post procedure, he was admitted to the floor for monitoring and pain management. POD #1 he was suffering hypertension but after restarting his home medications, this resolved. After a stable hospital course the patient was ready for transfer back to home.  Pain was controlled. Diet and activity were well tolerated.      Procedures  Performed    Procedure(s):  11/11/2020 (Amb[facility])  ENDOVASCULAR AORTIC ANEURSYSM REPAIR WITH BILATERAL RENAL ARTERY STENTS  -------------------    Consults:   Consults     No orders found for last 30 day(s).          Pertinent Test Results:   CBC    Results from last 7 days   Lab Units 11/12/20  0432 11/11/20  2123 11/10/20  1710   WBC 10*3/mm3 10.96*  --  9.77   HEMOGLOBIN g/dL 9.0* 9.2* 10.6*   PLATELETS 10*3/mm3 211  --  249     BMP   Results from last 7 days   Lab Units 11/12/20  0432 11/11/20 2123 11/11/20  1406 11/10/20  1710   SODIUM mmol/L 141 139 141 141   POTASSIUM mmol/L 4.2 5.0 5.3* 4.6   CHLORIDE mmol/L 109* 108* 109* 104   CO2 mmol/L 22.0 22.0 22.0 25.0   BUN mg/dL 33* 35* 37* 39*   CREATININE mg/dL 2.10* 2.00* 1.90* 1.95*   GLUCOSE mg/dL 126* 114* 124* 108*   MAGNESIUM mg/dL  --   --  2.5*  --          Condition on Discharge: Stable    Vital Signs  Temp:  [98.1 °F (36.7 °C)-99 °F (37.2 °C)] 99 °F (37.2 °C)  Heart Rate:  [52-79] 72  Resp:  [16-22] 18  BP: (119-172)/(58-79) 136/67  Arterial Line BP: (116-187)/(47-78) 142/47    Physical Exam:  AAOx3, NAD, no family at bedside  Respiratory: on 2L supplemental O2  Abdomen: soft, non-tender  BILATERAL groins: access site c/d/i with dermabond, soft, no signs of hematoma.   BLE: warm to touch, no edema or erythema, palpable DP and PT pulses    Discharge Disposition  Home       Discharge Medications      New Medications      Instructions Start Date   isosorbide dinitrate 30 MG tablet  Commonly known as: ISORDIL   90 mg, Oral, Every Evening      oxyCODONE 5 MG immediate release tablet  Commonly known as: Roxicodone   5 mg, Oral, Every 6 Hours PRN         Changes to Medications      Instructions Start Date   clopidogrel 75 MG tablet  Commonly known as: PLAVIX  What changed: additional instructions   75 mg, Oral, Daily      isosorbide mononitrate 60 MG 24 hr tablet  Commonly known as: IMDUR  What changed:   · how much to take  · when to take this   120 mg,  Oral, Every Morning         Continue These Medications      Instructions Start Date   ALBUTEROL IN   Inhalation, Nightly, 2.5mg/3mL       aspirin 81 MG chewable tablet   81 mg, Oral, Daily      atorvastatin 80 MG tablet  Commonly known as: LIPITOR   80 mg, Oral, Daily      Fluticasone Furoate-Vilanterol 100-25 MCG/INH inhaler  Commonly known as: BREO ELLIPTA   1 puff, Inhalation, Daily PRN      furosemide 40 MG tablet  Commonly known as: LASIX   40 mg, Oral, 2 Times Daily      guaiFENesin 600 MG 12 hr tablet  Commonly known as: MUCINEX   600 mg, Oral, Daily      hydrALAZINE 100 MG tablet  Commonly known as: APRESOLINE   100 mg, Oral, 3 Times Daily      METOPROLOL SUCCINATE PO   150 mg, Oral, Every Morning      NIFEdipine XL 90 MG 24 hr tablet  Commonly known as: PROCARDIA XL   90 mg, Oral, Daily      POTASSIUM CHLORIDE PO   20 mEq, Oral, Daily             Discharge Diet:   Diet Instructions     Advance Diet As Tolerated            Activity at Discharge:   Activity Instructions     Bathing Restrictions      Keep operative site clean and dry. Do not peel glue, it will come off on its own.    Type of Restriction: Bathing    Bathing Restrictions: No Tub Bath    Driving Restrictions      Type of Restriction: Driving    Driving Restrictions: No Driving While Taking Narcotics    Gradually Increase Activity Until at Pre-Hospitalization Level      No heavy lifting above 10 pounds          Follow-up Appointments  No future appointments.  Additional Instructions for the Follow-ups that You Need to Schedule     Call MD With Problems / Concerns   As directed      Discharge Follow-up with Specified Provider: Dr. Anurag Crook; 2 Weeks   As directed      To: Dr. Anurag Crook    Follow Up: 2 Weeks    Follow Up Details: Follow up on 12/10/2020 with CTA, our office will send a letter with the time of your appointment                  Maria Ines Nichols PA-C  11/12/20  15:18 EST

## 2020-11-12 NOTE — PLAN OF CARE
Goal Outcome Evaluation:  Plan of Care Reviewed With: patient  Progress: improving  Outcome Summary: VSS on RA while awake and 2L NC with sleep (Patient states he has home O2 at night). Pulses remain palpable and bilateral groin site c/d/I and soft. Good po intake. Pt unable to urinate again so tsai catheter placed. Initial 735 ml out and then another ~ 1500 during the night. Dilaudid given x 1 dose for pain at hs. Pt denies pain this am.

## 2020-11-12 NOTE — PLAN OF CARE
Goal Outcome Evaluation:  Plan of Care Reviewed With: patient  Progress: improving   Pt hypertensive this am requiring cardene. Home regimen of BP meds restarted. Cardene was sarah to be weaned off.  -150's. F/C d/c'd. Pt voided without  difficulty and ambulated in halls without difficulty. Orders received for d/c home.

## 2020-11-12 NOTE — PROGRESS NOTES
LOS: 1 day   Patient Care Team:  Williams Geronimo II, MD as PCP - General (Internal Medicine)      Subjective   Mr. He is alert and lying in bed. He denies any new concerns or pain at this time. States the staff has been trying a lot of different measures to control his SBP. He is tolerating po intake well but having difficulty voiding which he states was present before surgery.      History taken from: patient    Objective     Vital Signs  Temp:  [97.5 °F (36.4 °C)-98.9 °F (37.2 °C)] 98.5 °F (36.9 °C)  Heart Rate:  [48-70] 61  Resp:  [16-22] 20  BP: (122-157)/(62-92) 157/70  Arterial Line BP: ()/(47-78) 174/61    Physical Exam  AAOx3, NAD, no family at bedside  Respiratory: on 2L supplemental O2  Abdomen: soft, non-tender  BILATERAL groins: access site c/d/i with dermabond, soft, no signs of hematoma.   BLE: warm to touch, no edema or erythema, palpable DP and PT pulses    Results Review:     I reviewed the patient's new clinical results.  CBC    Results from last 7 days   Lab Units 11/12/20  0432 11/11/20 2123 11/10/20  1710   WBC 10*3/mm3 10.96*  --  9.77   HEMOGLOBIN g/dL 9.0* 9.2* 10.6*   PLATELETS 10*3/mm3 211  --  249     BMP   Results from last 7 days   Lab Units 11/12/20  0432 11/11/20 2123 11/11/20  1406 11/10/20  1710   SODIUM mmol/L 141 139 141 141   POTASSIUM mmol/L 4.2 5.0 5.3* 4.6   CHLORIDE mmol/L 109* 108* 109* 104   CO2 mmol/L 22.0 22.0 22.0 25.0   BUN mg/dL 33* 35* 37* 39*   CREATININE mg/dL 2.10* 2.00* 1.90* 1.95*   GLUCOSE mg/dL 126* 114* 124* 108*   MAGNESIUM mg/dL  --   --  2.5*  --           Current Facility-Administered Medications:   •  acetaminophen (TYLENOL) tablet 1,000 mg, 1,000 mg, Oral, Q8H, Anurag Crook MD, 1,000 mg at 11/12/20 0501  •  aspirin chewable tablet 81 mg, 81 mg, Oral, Daily, Anurag Crook MD, 81 mg at 11/11/20 1226  •  atorvastatin (LIPITOR) tablet 80 mg, 80 mg, Oral, Daily, Soraya Roa  •  budesonide-formoterol (SYMBICORT) 80-4.5 MCG/ACT  inhaler 2 puff, 2 puff, Inhalation, BID - RT, Anurag Crook MD, 2 puff at 11/12/20 0759  •  clopidogrel (PLAVIX) tablet 75 mg, 75 mg, Oral, Daily, Anurag Crook MD  •  guaiFENesin (MUCINEX) 12 hr tablet 600 mg, 600 mg, Oral, Daily, Anurag Crook MD  •  heparin (porcine) 5000 UNIT/ML injection 5,000 Units, 5,000 Units, Subcutaneous, Q8H, Anurag Crook MD, 5,000 Units at 11/12/20 0501  •  hydrALAZINE (APRESOLINE) injection 10 mg, 10 mg, Intravenous, Q15 Min PRN, Anurag Crook MD, 10 mg at 11/11/20 1828  •  hydrALAZINE (APRESOLINE) tablet 50 mg, 50 mg, Oral, TID, Anurag Crook MD, 50 mg at 11/11/20 2029  •  HYDROmorphone (DILAUDID) injection 0.5 mg, 0.5 mg, Intravenous, Q2H PRN, 0.5 mg at 11/11/20 1953 **AND** naloxone (NARCAN) injection 0.4 mg, 0.4 mg, Intravenous, Q5 Min PRN, Anurag Crook MD  •  ipratropium-albuterol (DUO-NEB) nebulizer solution 3 mL, 3 mL, Nebulization, Q6H While Awake - RT, Anurag Crook MD, 3 mL at 11/12/20 0759  •  isosorbide mononitrate (IMDUR) 24 hr tablet 60 mg, 60 mg, Oral, Daily, Anurag Crook MD  •  metoprolol succinate XL (TOPROL-XL) 24 hr tablet 150 mg, 150 mg, Oral, Q24H, Anurag Crook MD  •  niCARdipine (CARDENE) 20 mg in 200 mL NS infusion, 5-15 mg/hr, Intravenous, Titrated, Anurag Crook MD, Last Rate: 100 mL/hr at 11/11/20 1630, 10 mg/hr at 11/11/20 1630  •  NIFEdipine XL (PROCARDIA XL) 24 hr tablet 90 mg, 90 mg, Oral, Daily, Anurag Crook MD  •  ondansetron (ZOFRAN) tablet 4 mg, 4 mg, Oral, Q6H PRN **OR** ondansetron (ZOFRAN) injection 4 mg, 4 mg, Intravenous, Q6H PRN, Anurag Crook MD  •  sennosides-docusate (PERICOLACE) 8.6-50 MG per tablet 2 tablet, 2 tablet, Oral, BID PRN, Anurag Crook MD  •  sodium chloride 0.9 % infusion, 9 mL/hr, Intravenous, Continuous, Aníbal Zarate MD, Last Rate: 9 mL/hr at 11/11/20 0724     Assessment/Plan   Asymptomatic Infrarenal 5.3 cm Aortic Aneurysm and Renal  insufficiency with severe renal artery stenosis  - 11/11: ([facility]; [facility])      1.  Lateral common femoral artery percutaneous ultrasound-guided access (16 Fr            right, 18 Fr left)       2.  Aortogram with radiologic interpretation       3.  Intravascular ultrasound of abdominal aorta, right common and external iliac                 arteries (8 Fr [place] catheter)       4.  Percutaneous endovascular repair of infrarenal abdominal aortic aneurysm              A) [place] Excluder main body 35x14.5x18 delivered via left side              B) [place] Excluder contralateral limb extension 16x14.5 delivered via right side        5. Bilateral renal artery stent placement 6x29 mm VBX  - Labs reviewed, minimal change compared to pre-op values  - On supplemental O2 at home when sleeping, room air during day  - Patient unable to urinate, so tsai replaced, will try voiding trial again when       ambulating  - Restart Plavix, ASA and statin  - Restart all home med doses in hopes of better BP control  - Discharge with Oxycodone 5mg #20  - If able to get SBP controlled, ambulate and void, will plan for discharge today  - FU with Dr. Crook on 12/10 with CTA. Time to be sent in letter by office      Maria Ines Nichols PA-C  11/12/20  08:38 EST

## 2020-11-12 NOTE — PROGRESS NOTES
Multidisciplinary Rounds    Patient Name: Rony He  Date of Encounter: 11/12/20 10:16 EST  MRN: [id number]  Admission date: 11/11/2020    Reason for visit: MDR. RD to continue to follow per protocol.     Additional information obtained during MDR:  S/P endovascular AAA repair (11/11). Tolerating cardiac diet.     Current diet: Diet Regular; Cardiac  Supplement: No active supplement orders    Evaluation of Received Nutrient/Fluid Intake:  1 Day:   100% x 2 meals    EMR reviewed     Intervention:  Follow treatment plan  Care plan reviewed    Follow up:   Per protocol      Koki Carpio RD  10:16 EST  Time: 10min

## 2020-11-13 ENCOUNTER — READMISSION MANAGEMENT (OUTPATIENT)
Dept: CALL CENTER | Facility: HOSPITAL | Age: 65
End: 2020-11-13

## 2020-11-13 NOTE — OUTREACH NOTE
Prep Survey      Responses   [facility] facility patient discharged from?  [facility]   Is LACE score < 7 ?  No   Eligibility  Readm Mgmt   Discharge diagnosis  ENDOVASCULAR AORTIC ANEURSYSM REPAIR WITH BILATERAL RENAL ARTERY STENTS   Does the patient have one of the following disease processes/diagnoses(primary or secondary)?  Other   Does the patient have Home health ordered?  No   Is there a DME ordered?  No   Comments regarding appointments  f/u listed   Prep survey completed?  Yes          Emely Witt RN

## 2020-11-18 ENCOUNTER — READMISSION MANAGEMENT (OUTPATIENT)
Dept: CALL CENTER | Facility: HOSPITAL | Age: 65
End: 2020-11-18

## 2020-11-18 NOTE — OUTREACH NOTE
Medical Week 1 Survey      Responses   [facility] patient discharged from?  [place]   Does the patient have one of the following disease processes/diagnoses(primary or secondary)?  Other   Week 1 attempt successful?  Yes   Call start time  1714   Call end time  1715   Discharge diagnosis  ENDOVASCULAR AORTIC ANEURSYSM REPAIR WITH BILATERAL RENAL ARTERY STENTS   Meds reviewed with patient/caregiver?  Yes   Is the patient having any side effects they believe may be caused by any medication additions or changes?  No   Does the patient have all medications ordered at discharge?  Yes   Is the patient taking all medications as directed (includes completed medication regime)?  Yes   Does the patient have a primary care provider?   Yes   Does the patient have an appointment with their PCP within 7 days of discharge?  Yes   Has the patient kept scheduled appointments due by today?  Yes   Has home health visited the patient within 72 hours of discharge?  N/A   Psychosocial issues?  No   Did the patient receive a copy of their discharge instructions?  Yes   Nursing interventions  Reviewed instructions with patient   What is the patient's perception of their health status since discharge?  Returned to baseline/stable   Is the patient/caregiver able to teach back signs and symptoms related to disease process for when to call PCP?  Yes   Is the patient/caregiver able to teach back signs and symptoms related to disease process for when to call 911?  Yes   Is the patient/caregiver able to teach back the hierarchy of who to call/visit for symptoms/problems? PCP, Specialist, Home health nurse, Urgent Care, ED, 911  Yes   Week 1 call completed?  Yes          Bobby Hernandez RN

## 2020-11-25 ENCOUNTER — READMISSION MANAGEMENT (OUTPATIENT)
Dept: CALL CENTER | Facility: HOSPITAL | Age: 65
End: 2020-11-25

## 2020-11-25 NOTE — OUTREACH NOTE
Medical Week 2 Survey      Responses   [facility] patient discharged from?  [place]   Does the patient have one of the following disease processes/diagnoses(primary or secondary)?  Other   Week 2 attempt successful?  Yes   Call start time  1204   Call end time  1214   Week 2 Call Completed?  Yes   Wrap up additional comments  Pt reported his surgery went well. Pt was very sob at the time of this call. He reported that he suffered a nose bleed 3 days ago and aspirated blood. Pt has been sob ever since and was stronly encouraged to go to ED now. Pt states he will go.          Alexia Montero RN

## (undated) DEVICE — PERCLOSE PROGLIDE™ SUTURE-MEDIATED CLOSURE SYSTEM: Brand: PERCLOSE PROGLIDE™

## (undated) DEVICE — DECANT BG O JET

## (undated) DEVICE — RADIFOCUS GLIDEWIRE ADVANTAGE GUIDEWIRE: Brand: GLIDEWIRE ADVANTAGE

## (undated) DEVICE — GW STARTER JB STR .035 15X150CM

## (undated) DEVICE — BALN OCCL MOLDING .035 10TO37MM 4X90CM

## (undated) DEVICE — Device

## (undated) DEVICE — CATH IMG IVUS VISIONS .035 DIG 8.2F

## (undated) DEVICE — TBG INJ CONTRL EXCITE RA 1200PSI 48IN

## (undated) DEVICE — NDL PERC 1PRT THNWALL W/BASEPLT 18G 7CM

## (undated) DEVICE — PK CATH CARD 10

## (undated) DEVICE — SI AVANTI+ 7F STD W/GW  NO OBT: Brand: AVANTI

## (undated) DEVICE — CATH DIAG EXPO M/ PK 6FR FL4/FR4 PIG 3PK

## (undated) DEVICE — KT VLV HEMO MAP ACC PLS LG/BORE MTL/INTRO W/TORQ/DEV

## (undated) DEVICE — INTRO SHEATH DRYSEAL FLEX 12F4TO4.7MM 33CM

## (undated) DEVICE — PK VASC 10

## (undated) DEVICE — GUIDE CATHETER: Brand: MACH1™

## (undated) DEVICE — FEMORAL ENTRY ANGIOGRAPHY SHIELD-ORANGE: Brand: RADPAD

## (undated) DEVICE — RADIFOCUS GLIDEWIRE: Brand: GLIDEWIRE

## (undated) DEVICE — INTRO SHEATH DRYSEAL FLEX 18F 6.0TO6.7MM 33CM

## (undated) DEVICE — INTRO STEER AGILIS NXT MD/CURL 8.5F 61CM

## (undated) DEVICE — SYR LUERLOK 50ML

## (undated) DEVICE — SYR CONTRL LUERLOK 10CC

## (undated) DEVICE — GW PRESS VERRATA STR 185CM 10185P

## (undated) DEVICE — INFLATION DEVICE: Brand: ENCORE™ 26

## (undated) DEVICE — GLV SURG BIOGEL LTX PF 7 1/2

## (undated) DEVICE — LBL STRL BLANK

## (undated) DEVICE — SUCTION CANISTER, 2500CC, RIGID: Brand: DEROYAL

## (undated) DEVICE — DEV COMP RAD PRELUDESYNC 24CM

## (undated) DEVICE — INTENDED USE FOR SURGICAL MARKING ON INTACT SKIN, ALSO PROVIDES A PERMANENT METHOD OF IDENTIFYING OBJECTS IN THE OPERATING ROOM: Brand: WRITESITE® REGULAR TIP SKIN MARKER

## (undated) DEVICE — SNAP KOVER: Brand: UNBRANDED

## (undated) DEVICE — SYR LL TP 10ML STRL

## (undated) DEVICE — GW AMPLTZ SUPERSTIFF STR .035IN 180CM

## (undated) DEVICE — MODEL BT2000 P/N 700287-012KIT CONTENTS: MANIFOLD WITH SALINE AND CONTRAST PORTS, SALINE TUBING WITH SPIKE AND HAND SYRINGE, TRANSDUCER: Brand: BT2000 AUTOMATED MANIFOLD KIT

## (undated) DEVICE — HI-TORQUE WHISPER MS GUIDE WIRE .014 J TIP 3.0 CM X 190 CM: Brand: HI-TORQUE WHISPER

## (undated) DEVICE — ST ACC MICROPUNCTURE .018 TRANSLSS/PLAT/TP 5F/10CM 21G/10CM

## (undated) DEVICE — 150CC POWER INJ SYR: Brand: DEROYAL

## (undated) DEVICE — INTRO SHEATH DRYSEAL FLEX 14F 4.7TO5.3MM 33CM

## (undated) DEVICE — SI AVANTI+ 6F STD W/GW  NO OBT: Brand: AVANTI

## (undated) DEVICE — ST INF PRI SMRTSTE 20DRP 2VLV 24ML 117

## (undated) DEVICE — SI AVANTI+ 8F STD W/GW  NO OBT: Brand: AVANTI

## (undated) DEVICE — GLIDESHEATH BASIC HYDROPHILIC COATED INTRODUCER SHEATH: Brand: GLIDESHEATH

## (undated) DEVICE — CATH SELCT IMAGER2 CONTRA2 4F 65MM .035IN

## (undated) DEVICE — CATH DIAG EXPO .056 FL3.5 6F 100CM

## (undated) DEVICE — MODEL AT P65, P/N 701554-001KIT CONTENTS: HAND CONTROLLER, 3-WAY HIGH-PRESSURE STOPCOCK WITH ROTATING END AND PREMIUM HIGH-PRESSURE TUBING: Brand: ANGIOTOUCH® KIT

## (undated) DEVICE — MEDI-VAC YANKAUER SUCTION HANDLE W/BULBOUS TIP: Brand: CARDINAL HEALTH

## (undated) DEVICE — UNDERGLV SURG BIOGEL INDICAT PI SZ8 BLU

## (undated) DEVICE — INTRO SHEATH MOBICATH BI DIR 8.5F 1792CM

## (undated) DEVICE — SYR LUERLOK 20CC BX/50

## (undated) DEVICE — 3M™ IOBAN™ 2 ANTIMICROBIAL INCISE DRAPE 6650EZ: Brand: IOBAN™ 2

## (undated) DEVICE — NDL HYPO ECLPS SFTY 22G 1 1/2IN

## (undated) DEVICE — SKIN AFFIX SURG ADHESIVE 72/CS 0.55ML: Brand: MEDLINE

## (undated) DEVICE — 3M™ STERI-DRAPE™ FLUOROSCOPE DRAPE, 10 PER CARTON / 4 CARTONS PER CASE, 1012: Brand: STERI-DRAPE™

## (undated) DEVICE — RADIFOCUS GLIDECATH: Brand: GLIDECATH